# Patient Record
Sex: MALE | Race: WHITE | NOT HISPANIC OR LATINO | Employment: FULL TIME | URBAN - METROPOLITAN AREA
[De-identification: names, ages, dates, MRNs, and addresses within clinical notes are randomized per-mention and may not be internally consistent; named-entity substitution may affect disease eponyms.]

---

## 2021-02-10 ENCOUNTER — TRANSFERRED RECORDS (OUTPATIENT)
Dept: HEALTH INFORMATION MANAGEMENT | Facility: CLINIC | Age: 31
End: 2021-02-10

## 2021-02-10 ENCOUNTER — HOSPITAL ENCOUNTER (OUTPATIENT)
Facility: CLINIC | Age: 31
Setting detail: OBSERVATION
Discharge: HOME | End: 2021-02-11
Attending: NEUROLOGICAL SURGERY | Admitting: NEUROLOGICAL SURGERY
Payer: COMMERCIAL

## 2021-02-10 DIAGNOSIS — Q28.2 ARTERIOVENOUS MALFORMATION OF BRAIN: Primary | ICD-10-CM

## 2021-02-10 PROBLEM — R58 BLEEDING: Status: ACTIVE | Noted: 2021-02-10

## 2021-02-10 LAB
ALBUMIN SERPL-MCNC: 4 G/DL (ref 3.4–5)
ALP SERPL-CCNC: 43 U/L (ref 40–150)
ALT SERPL W P-5'-P-CCNC: 22 U/L (ref 0–70)
ANION GAP SERPL CALCULATED.3IONS-SCNC: 8 MMOL/L (ref 3–14)
APTT PPP: 24 SEC (ref 22–37)
AST SERPL W P-5'-P-CCNC: 20 U/L (ref 0–45)
BILIRUB SERPL-MCNC: 0.6 MG/DL (ref 0.2–1.3)
BUN SERPL-MCNC: 15 MG/DL (ref 7–30)
CALCIUM SERPL-MCNC: 9.1 MG/DL (ref 8.5–10.1)
CHLORIDE SERPL-SCNC: 104 MMOL/L (ref 94–109)
CO2 SERPL-SCNC: 27 MMOL/L (ref 20–32)
CREAT SERPL-MCNC: 1.05 MG/DL (ref 0.72–1.25)
CREAT SERPL-MCNC: 1.07 MG/DL (ref 0.66–1.25)
ERYTHROCYTE [DISTWIDTH] IN BLOOD BY AUTOMATED COUNT: 12.7 % (ref 10–15)
GFR SERPL CREATININE-BSD FRML MDRD: >60 ML/MIN/1.73M2
GFR SERPL CREATININE-BSD FRML MDRD: >90 ML/MIN/{1.73_M2}
GLUCOSE SERPL-MCNC: 83 MG/DL (ref 65–100)
GLUCOSE SERPL-MCNC: 88 MG/DL (ref 70–99)
HCT VFR BLD AUTO: 42 % (ref 40–53)
HGB BLD-MCNC: 14.5 G/DL (ref 13.3–17.7)
INR PPP: 1
INR PPP: 1.05 (ref 0.86–1.14)
MAGNESIUM SERPL-MCNC: 2.4 MG/DL (ref 1.6–2.3)
MCH RBC QN AUTO: 31.1 PG (ref 26.5–33)
MCHC RBC AUTO-ENTMCNC: 34.5 G/DL (ref 31.5–36.5)
MCV RBC AUTO: 90 FL (ref 78–100)
PHOSPHATE SERPL-MCNC: 3.5 MG/DL (ref 2.5–4.5)
PLATELET # BLD AUTO: 234 10E9/L (ref 150–450)
POTASSIUM SERPL-SCNC: 3.9 MMOL/L (ref 3.4–5.3)
POTASSIUM SERPL-SCNC: 4 MMOL/L (ref 3.5–5)
PROT SERPL-MCNC: 7.4 G/DL (ref 6.8–8.8)
RBC # BLD AUTO: 4.66 10E12/L (ref 4.4–5.9)
SODIUM SERPL-SCNC: 138 MMOL/L (ref 133–144)
WBC # BLD AUTO: 13.2 10E9/L (ref 4–11)

## 2021-02-10 PROCEDURE — 86900 BLOOD TYPING SEROLOGIC ABO: CPT | Performed by: STUDENT IN AN ORGANIZED HEALTH CARE EDUCATION/TRAINING PROGRAM

## 2021-02-10 PROCEDURE — 83735 ASSAY OF MAGNESIUM: CPT | Performed by: NEUROLOGICAL SURGERY

## 2021-02-10 PROCEDURE — 84100 ASSAY OF PHOSPHORUS: CPT | Performed by: NEUROLOGICAL SURGERY

## 2021-02-10 PROCEDURE — 93010 ELECTROCARDIOGRAM REPORT: CPT | Performed by: INTERNAL MEDICINE

## 2021-02-10 PROCEDURE — 36415 COLL VENOUS BLD VENIPUNCTURE: CPT | Performed by: STUDENT IN AN ORGANIZED HEALTH CARE EDUCATION/TRAINING PROGRAM

## 2021-02-10 PROCEDURE — 86850 RBC ANTIBODY SCREEN: CPT | Performed by: STUDENT IN AN ORGANIZED HEALTH CARE EDUCATION/TRAINING PROGRAM

## 2021-02-10 PROCEDURE — 80053 COMPREHEN METABOLIC PANEL: CPT | Performed by: NEUROLOGICAL SURGERY

## 2021-02-10 PROCEDURE — 200N000002 HC R&B ICU UMMC

## 2021-02-10 PROCEDURE — 258N000003 HC RX IP 258 OP 636: Performed by: STUDENT IN AN ORGANIZED HEALTH CARE EDUCATION/TRAINING PROGRAM

## 2021-02-10 PROCEDURE — 250N000011 HC RX IP 250 OP 636: Performed by: STUDENT IN AN ORGANIZED HEALTH CARE EDUCATION/TRAINING PROGRAM

## 2021-02-10 PROCEDURE — 86901 BLOOD TYPING SEROLOGIC RH(D): CPT | Performed by: STUDENT IN AN ORGANIZED HEALTH CARE EDUCATION/TRAINING PROGRAM

## 2021-02-10 PROCEDURE — 85610 PROTHROMBIN TIME: CPT | Performed by: STUDENT IN AN ORGANIZED HEALTH CARE EDUCATION/TRAINING PROGRAM

## 2021-02-10 PROCEDURE — 85730 THROMBOPLASTIN TIME PARTIAL: CPT | Performed by: STUDENT IN AN ORGANIZED HEALTH CARE EDUCATION/TRAINING PROGRAM

## 2021-02-10 PROCEDURE — 85027 COMPLETE CBC AUTOMATED: CPT | Performed by: STUDENT IN AN ORGANIZED HEALTH CARE EDUCATION/TRAINING PROGRAM

## 2021-02-10 PROCEDURE — 84100 ASSAY OF PHOSPHORUS: CPT | Performed by: STUDENT IN AN ORGANIZED HEALTH CARE EDUCATION/TRAINING PROGRAM

## 2021-02-10 PROCEDURE — 80053 COMPREHEN METABOLIC PANEL: CPT | Performed by: STUDENT IN AN ORGANIZED HEALTH CARE EDUCATION/TRAINING PROGRAM

## 2021-02-10 RX ORDER — SODIUM CHLORIDE 9 MG/ML
INJECTION, SOLUTION INTRAVENOUS CONTINUOUS
Status: DISCONTINUED | OUTPATIENT
Start: 2021-02-10 | End: 2021-02-11 | Stop reason: HOSPADM

## 2021-02-10 RX ORDER — ONDANSETRON 2 MG/ML
4 INJECTION INTRAMUSCULAR; INTRAVENOUS EVERY 6 HOURS PRN
Status: DISCONTINUED | OUTPATIENT
Start: 2021-02-10 | End: 2021-02-11 | Stop reason: HOSPADM

## 2021-02-10 RX ORDER — ONDANSETRON 4 MG/1
4 TABLET, ORALLY DISINTEGRATING ORAL EVERY 6 HOURS PRN
Status: DISCONTINUED | OUTPATIENT
Start: 2021-02-10 | End: 2021-02-11 | Stop reason: HOSPADM

## 2021-02-10 RX ORDER — DEXTROSE MONOHYDRATE 25 G/50ML
25-50 INJECTION, SOLUTION INTRAVENOUS
Status: DISCONTINUED | OUTPATIENT
Start: 2021-02-10 | End: 2021-02-11 | Stop reason: HOSPADM

## 2021-02-10 RX ORDER — NICOTINE POLACRILEX 4 MG
15-30 LOZENGE BUCCAL
Status: DISCONTINUED | OUTPATIENT
Start: 2021-02-10 | End: 2021-02-11 | Stop reason: HOSPADM

## 2021-02-10 RX ORDER — LABETALOL HYDROCHLORIDE 5 MG/ML
10-20 INJECTION, SOLUTION INTRAVENOUS EVERY 6 HOURS PRN
Status: DISCONTINUED | OUTPATIENT
Start: 2021-02-10 | End: 2021-02-11 | Stop reason: HOSPADM

## 2021-02-10 RX ADMIN — LEVETIRACETAM 2000 MG: 100 INJECTION, SOLUTION INTRAVENOUS at 23:30

## 2021-02-10 RX ADMIN — SODIUM CHLORIDE: 9 INJECTION, SOLUTION INTRAVENOUS at 23:15

## 2021-02-10 ASSESSMENT — ACTIVITIES OF DAILY LIVING (ADL)
WEAR_GLASSES_OR_BLIND: NO
TOILETING_ISSUES: NO
CONCENTRATING,_REMEMBERING_OR_MAKING_DECISIONS_DIFFICULTY: NO
DOING_ERRANDS_INDEPENDENTLY_DIFFICULTY: NO
FALL_HISTORY_WITHIN_LAST_SIX_MONTHS: NO
DRESSING/BATHING_DIFFICULTY: NO
WALKING_OR_CLIMBING_STAIRS_DIFFICULTY: NO
HEARING_DIFFICULTY_OR_DEAF: NO
PATIENT_/_FAMILY_COMMUNICATION_STYLE: SPOKEN LANGUAGE (ENGLISH OR BILINGUAL)
DIFFICULTY_EATING/SWALLOWING: NO
DIFFICULTY_COMMUNICATING: NO

## 2021-02-10 ASSESSMENT — MIFFLIN-ST. JEOR: SCORE: 2064.88

## 2021-02-11 ENCOUNTER — APPOINTMENT (OUTPATIENT)
Dept: CT IMAGING | Facility: CLINIC | Age: 31
End: 2021-02-11
Attending: STUDENT IN AN ORGANIZED HEALTH CARE EDUCATION/TRAINING PROGRAM
Payer: COMMERCIAL

## 2021-02-11 ENCOUNTER — PATIENT OUTREACH (OUTPATIENT)
Dept: CARE COORDINATION | Facility: CLINIC | Age: 31
End: 2021-02-11

## 2021-02-11 ENCOUNTER — TRANSFERRED RECORDS (OUTPATIENT)
Dept: HEALTH INFORMATION MANAGEMENT | Facility: CLINIC | Age: 31
End: 2021-02-11

## 2021-02-11 ENCOUNTER — APPOINTMENT (OUTPATIENT)
Dept: INTERVENTIONAL RADIOLOGY/VASCULAR | Facility: CLINIC | Age: 31
End: 2021-02-11
Attending: NEUROLOGICAL SURGERY
Payer: COMMERCIAL

## 2021-02-11 VITALS
HEIGHT: 73 IN | BODY MASS INDEX: 30.42 KG/M2 | RESPIRATION RATE: 14 BRPM | OXYGEN SATURATION: 100 % | TEMPERATURE: 97.2 F | WEIGHT: 229.5 LBS | SYSTOLIC BLOOD PRESSURE: 136 MMHG | HEART RATE: 64 BPM | DIASTOLIC BLOOD PRESSURE: 78 MMHG

## 2021-02-11 DIAGNOSIS — Q28.2 AVM (ARTERIOVENOUS MALFORMATION) BRAIN: Primary | ICD-10-CM

## 2021-02-11 DIAGNOSIS — Z11.59 ENCOUNTER FOR SCREENING FOR OTHER VIRAL DISEASES: ICD-10-CM

## 2021-02-11 LAB
ANION GAP SERPL CALCULATED.3IONS-SCNC: 4 MMOL/L (ref 3–14)
BUN SERPL-MCNC: 15 MG/DL (ref 7–30)
CALCIUM SERPL-MCNC: 8.5 MG/DL (ref 8.5–10.1)
CHLORIDE SERPL-SCNC: 106 MMOL/L (ref 94–109)
CO2 SERPL-SCNC: 27 MMOL/L (ref 20–32)
CREAT SERPL-MCNC: 1.11 MG/DL (ref 0.66–1.25)
ERYTHROCYTE [DISTWIDTH] IN BLOOD BY AUTOMATED COUNT: 12.8 % (ref 10–15)
GFR SERPL CREATININE-BSD FRML MDRD: 90 ML/MIN/{1.73_M2}
GLUCOSE SERPL-MCNC: 98 MG/DL (ref 70–99)
HCT VFR BLD AUTO: 40.6 % (ref 40–53)
HGB BLD-MCNC: 14.1 G/DL (ref 13.3–17.7)
MAGNESIUM SERPL-MCNC: 2.5 MG/DL (ref 1.6–2.3)
MCH RBC QN AUTO: 31.7 PG (ref 26.5–33)
MCHC RBC AUTO-ENTMCNC: 34.7 G/DL (ref 31.5–36.5)
MCV RBC AUTO: 91 FL (ref 78–100)
PHOSPHATE SERPL-MCNC: 3.9 MG/DL (ref 2.5–4.5)
PLATELET # BLD AUTO: 236 10E9/L (ref 150–450)
POTASSIUM SERPL-SCNC: 3.7 MMOL/L (ref 3.4–5.3)
RBC # BLD AUTO: 4.45 10E12/L (ref 4.4–5.9)
SODIUM SERPL-SCNC: 137 MMOL/L (ref 133–144)
WBC # BLD AUTO: 10.8 10E9/L (ref 4–11)

## 2021-02-11 PROCEDURE — 85027 COMPLETE CBC AUTOMATED: CPT | Performed by: STUDENT IN AN ORGANIZED HEALTH CARE EDUCATION/TRAINING PROGRAM

## 2021-02-11 PROCEDURE — 70450 CT HEAD/BRAIN W/O DYE: CPT

## 2021-02-11 PROCEDURE — 272N000566 HC SHEATH CR3

## 2021-02-11 PROCEDURE — 258N000003 HC RX IP 258 OP 636: Performed by: STUDENT IN AN ORGANIZED HEALTH CARE EDUCATION/TRAINING PROGRAM

## 2021-02-11 PROCEDURE — G0378 HOSPITAL OBSERVATION PER HR: HCPCS

## 2021-02-11 PROCEDURE — C1887 CATHETER, GUIDING: HCPCS

## 2021-02-11 PROCEDURE — 250N000011 HC RX IP 250 OP 636: Performed by: NEUROLOGICAL SURGERY

## 2021-02-11 PROCEDURE — 255N000002 HC RX 255 OP 636: Performed by: NEUROLOGICAL SURGERY

## 2021-02-11 PROCEDURE — 36224 PLACE CATH CAROTD ART: CPT

## 2021-02-11 PROCEDURE — 84100 ASSAY OF PHOSPHORUS: CPT | Performed by: STUDENT IN AN ORGANIZED HEALTH CARE EDUCATION/TRAINING PROGRAM

## 2021-02-11 PROCEDURE — 250N000011 HC RX IP 250 OP 636: Performed by: PSYCHIATRY & NEUROLOGY

## 2021-02-11 PROCEDURE — 272N000506 HC NEEDLE CR6

## 2021-02-11 PROCEDURE — 70450 CT HEAD/BRAIN W/O DYE: CPT | Mod: 26 | Performed by: RADIOLOGY

## 2021-02-11 PROCEDURE — 36415 COLL VENOUS BLD VENIPUNCTURE: CPT | Performed by: STUDENT IN AN ORGANIZED HEALTH CARE EDUCATION/TRAINING PROGRAM

## 2021-02-11 PROCEDURE — 99153 MOD SED SAME PHYS/QHP EA: CPT

## 2021-02-11 PROCEDURE — 99152 MOD SED SAME PHYS/QHP 5/>YRS: CPT

## 2021-02-11 PROCEDURE — 93005 ELECTROCARDIOGRAM TRACING: CPT

## 2021-02-11 PROCEDURE — 99205 OFFICE O/P NEW HI 60 MIN: CPT | Mod: GC | Performed by: PSYCHIATRY & NEUROLOGY

## 2021-02-11 PROCEDURE — C1769 GUIDE WIRE: HCPCS

## 2021-02-11 PROCEDURE — 250N000009 HC RX 250: Performed by: PSYCHIATRY & NEUROLOGY

## 2021-02-11 PROCEDURE — 36228 PLACE CATH INTRACRANIAL ART: CPT

## 2021-02-11 PROCEDURE — 999N000054 HC STATISTIC EKG NON-CHARGEABLE

## 2021-02-11 PROCEDURE — 99152 MOD SED SAME PHYS/QHP 5/>YRS: CPT | Mod: GC | Performed by: NEUROLOGICAL SURGERY

## 2021-02-11 PROCEDURE — 80048 BASIC METABOLIC PNL TOTAL CA: CPT | Performed by: STUDENT IN AN ORGANIZED HEALTH CARE EDUCATION/TRAINING PROGRAM

## 2021-02-11 PROCEDURE — 272N000192 HC ACCESSORY CR2

## 2021-02-11 PROCEDURE — 36224 PLACE CATH CAROTD ART: CPT | Mod: LT | Performed by: NEUROLOGICAL SURGERY

## 2021-02-11 PROCEDURE — 83735 ASSAY OF MAGNESIUM: CPT | Performed by: STUDENT IN AN ORGANIZED HEALTH CARE EDUCATION/TRAINING PROGRAM

## 2021-02-11 PROCEDURE — 250N000011 HC RX IP 250 OP 636: Performed by: STUDENT IN AN ORGANIZED HEALTH CARE EDUCATION/TRAINING PROGRAM

## 2021-02-11 PROCEDURE — 36228 PLACE CATH INTRACRANIAL ART: CPT | Mod: LT | Performed by: NEUROLOGICAL SURGERY

## 2021-02-11 RX ORDER — NALOXONE HYDROCHLORIDE 0.4 MG/ML
0.4 INJECTION, SOLUTION INTRAMUSCULAR; INTRAVENOUS; SUBCUTANEOUS
Status: DISCONTINUED | OUTPATIENT
Start: 2021-02-11 | End: 2021-02-11 | Stop reason: HOSPADM

## 2021-02-11 RX ORDER — HEPARIN SODIUM 200 [USP'U]/100ML
1 INJECTION, SOLUTION INTRAVENOUS CONTINUOUS PRN
Status: DISCONTINUED | OUTPATIENT
Start: 2021-02-11 | End: 2021-02-11 | Stop reason: HOSPADM

## 2021-02-11 RX ORDER — LEVETIRACETAM 750 MG/1
750 TABLET ORAL 2 TIMES DAILY
Qty: 60 TABLET | Refills: 3 | Status: SHIPPED | OUTPATIENT
Start: 2021-02-11 | End: 2021-02-11

## 2021-02-11 RX ORDER — FLUOXETINE 10 MG/1
30 CAPSULE ORAL DAILY
COMMUNITY

## 2021-02-11 RX ORDER — PROCHLORPERAZINE 25 MG
25 SUPPOSITORY, RECTAL RECTAL EVERY 12 HOURS PRN
Status: CANCELLED | OUTPATIENT
Start: 2021-02-11

## 2021-02-11 RX ORDER — ONDANSETRON 4 MG/1
4 TABLET, ORALLY DISINTEGRATING ORAL EVERY 6 HOURS PRN
Status: CANCELLED | OUTPATIENT
Start: 2021-02-11

## 2021-02-11 RX ORDER — DEXTROSE MONOHYDRATE 25 G/50ML
25-50 INJECTION, SOLUTION INTRAVENOUS
Status: CANCELLED | OUTPATIENT
Start: 2021-02-11

## 2021-02-11 RX ORDER — PROTAMINE SULFATE 10 MG/ML
20 INJECTION, SOLUTION INTRAVENOUS ONCE
Status: COMPLETED | OUTPATIENT
Start: 2021-02-11 | End: 2021-02-11

## 2021-02-11 RX ORDER — NALOXONE HYDROCHLORIDE 0.4 MG/ML
0.2 INJECTION, SOLUTION INTRAMUSCULAR; INTRAVENOUS; SUBCUTANEOUS
Status: DISCONTINUED | OUTPATIENT
Start: 2021-02-11 | End: 2021-02-11 | Stop reason: HOSPADM

## 2021-02-11 RX ORDER — HEPARIN SODIUM 1000 [USP'U]/ML
500-8000 INJECTION, SOLUTION INTRAVENOUS; SUBCUTANEOUS
Status: COMPLETED | OUTPATIENT
Start: 2021-02-11 | End: 2021-02-11

## 2021-02-11 RX ORDER — PROCHLORPERAZINE MALEATE 10 MG
10 TABLET ORAL EVERY 6 HOURS PRN
Status: CANCELLED | OUTPATIENT
Start: 2021-02-11

## 2021-02-11 RX ORDER — FLUMAZENIL 0.1 MG/ML
0.2 INJECTION, SOLUTION INTRAVENOUS
Status: DISCONTINUED | OUTPATIENT
Start: 2021-02-11 | End: 2021-02-11 | Stop reason: HOSPADM

## 2021-02-11 RX ORDER — METOCLOPRAMIDE HYDROCHLORIDE 5 MG/ML
10 INJECTION INTRAMUSCULAR; INTRAVENOUS EVERY 6 HOURS PRN
Status: CANCELLED | OUTPATIENT
Start: 2021-02-11

## 2021-02-11 RX ORDER — SODIUM CHLORIDE 9 MG/ML
INJECTION, SOLUTION INTRAVENOUS CONTINUOUS
Status: CANCELLED | OUTPATIENT
Start: 2021-02-11

## 2021-02-11 RX ORDER — LIDOCAINE 40 MG/G
CREAM TOPICAL
Status: DISCONTINUED | OUTPATIENT
Start: 2021-02-11 | End: 2021-02-11 | Stop reason: HOSPADM

## 2021-02-11 RX ORDER — NICOTINE POLACRILEX 4 MG
15-30 LOZENGE BUCCAL
Status: CANCELLED | OUTPATIENT
Start: 2021-02-11

## 2021-02-11 RX ORDER — LANOLIN ALCOHOL/MO/W.PET/CERES
400 CREAM (GRAM) TOPICAL DAILY
Status: ON HOLD | COMMUNITY
End: 2021-02-11

## 2021-02-11 RX ORDER — DEXTROSE MONOHYDRATE 25 G/50ML
25-50 INJECTION, SOLUTION INTRAVENOUS
Status: DISCONTINUED | OUTPATIENT
Start: 2021-02-11 | End: 2021-02-11 | Stop reason: HOSPADM

## 2021-02-11 RX ORDER — SODIUM CHLORIDE 9 MG/ML
INJECTION, SOLUTION INTRAVENOUS CONTINUOUS
Status: DISCONTINUED | OUTPATIENT
Start: 2021-02-11 | End: 2021-02-11 | Stop reason: HOSPADM

## 2021-02-11 RX ORDER — ONDANSETRON 2 MG/ML
4 INJECTION INTRAMUSCULAR; INTRAVENOUS EVERY 6 HOURS PRN
Status: CANCELLED | OUTPATIENT
Start: 2021-02-11

## 2021-02-11 RX ORDER — NICOTINE POLACRILEX 4 MG
15-30 LOZENGE BUCCAL
Status: DISCONTINUED | OUTPATIENT
Start: 2021-02-11 | End: 2021-02-11 | Stop reason: HOSPADM

## 2021-02-11 RX ORDER — LEVETIRACETAM 750 MG/1
750 TABLET ORAL 2 TIMES DAILY
Qty: 60 TABLET | Refills: 3 | Status: SHIPPED | OUTPATIENT
Start: 2021-02-11

## 2021-02-11 RX ORDER — FENTANYL CITRATE 50 UG/ML
25-50 INJECTION, SOLUTION INTRAMUSCULAR; INTRAVENOUS EVERY 5 MIN PRN
Status: DISCONTINUED | OUTPATIENT
Start: 2021-02-11 | End: 2021-02-11 | Stop reason: HOSPADM

## 2021-02-11 RX ORDER — LORAZEPAM 0.5 MG/1
0.5 TABLET ORAL EVERY 6 HOURS PRN
COMMUNITY

## 2021-02-11 RX ORDER — METOCLOPRAMIDE 10 MG/1
10 TABLET ORAL EVERY 6 HOURS PRN
Status: CANCELLED | OUTPATIENT
Start: 2021-02-11

## 2021-02-11 RX ORDER — IODIXANOL 320 MG/ML
150 INJECTION, SOLUTION INTRAVASCULAR ONCE
Status: COMPLETED | OUTPATIENT
Start: 2021-02-11 | End: 2021-02-11

## 2021-02-11 RX ADMIN — FENTANYL CITRATE 25 MCG: 50 INJECTION, SOLUTION INTRAMUSCULAR; INTRAVENOUS at 11:35

## 2021-02-11 RX ADMIN — SODIUM CHLORIDE: 9 INJECTION, SOLUTION INTRAVENOUS at 06:46

## 2021-02-11 RX ADMIN — FENTANYL CITRATE 25 MCG: 50 INJECTION, SOLUTION INTRAMUSCULAR; INTRAVENOUS at 12:12

## 2021-02-11 RX ADMIN — PROTAMINE SULFATE 20 MG: 10 INJECTION, SOLUTION INTRAVENOUS at 12:31

## 2021-02-11 RX ADMIN — FENTANYL CITRATE 50 MCG: 50 INJECTION, SOLUTION INTRAMUSCULAR; INTRAVENOUS at 11:19

## 2021-02-11 RX ADMIN — FENTANYL CITRATE 25 MCG: 50 INJECTION, SOLUTION INTRAMUSCULAR; INTRAVENOUS at 11:25

## 2021-02-11 RX ADMIN — LEVETIRACETAM 750 MG: 100 INJECTION, SOLUTION INTRAVENOUS at 08:03

## 2021-02-11 RX ADMIN — LIDOCAINE HYDROCHLORIDE 20 ML: 10 INJECTION, SOLUTION EPIDURAL; INFILTRATION; INTRACAUDAL; PERINEURAL at 11:31

## 2021-02-11 RX ADMIN — MIDAZOLAM 0.5 MG: 1 INJECTION INTRAMUSCULAR; INTRAVENOUS at 11:35

## 2021-02-11 RX ADMIN — MIDAZOLAM 1 MG: 1 INJECTION INTRAMUSCULAR; INTRAVENOUS at 11:19

## 2021-02-11 RX ADMIN — HEPARIN SODIUM 6000 UNITS: 1000 INJECTION INTRAVENOUS; SUBCUTANEOUS at 11:30

## 2021-02-11 RX ADMIN — MIDAZOLAM 0.5 MG: 1 INJECTION INTRAMUSCULAR; INTRAVENOUS at 11:25

## 2021-02-11 RX ADMIN — IODIXANOL 40 ML: 320 INJECTION, SOLUTION INTRAVASCULAR at 11:30

## 2021-02-11 ASSESSMENT — VISUAL ACUITY
OU: BASELINE

## 2021-02-11 ASSESSMENT — ACTIVITIES OF DAILY LIVING (ADL)
ADLS_ACUITY_SCORE: 15
ADLS_ACUITY_SCORE: 14
ADLS_ACUITY_SCORE: 15

## 2021-02-11 NOTE — PROGRESS NOTES
Admitted/transferred from: [facility]   Reason for admission/transfer: Neurosurgery consult for AVM   2 RN skin assessment: completed by Astrid STONER and Johan NAJERA  Result of skin assessment and interventions/actions: R groin site from CTA. Intact, no hematoma. Tongue reddened d/t biting during seizure.  Height, weight, drug calc weight: Done  Patient belongings (see Flowsheet)  MDRO education added to care planN/A  ?

## 2021-02-11 NOTE — PROVIDER NOTIFICATION
Pt refusing neuro checks-IR MD notified and at bedside-Pt awake alert, calm when left undisturbed.Reg diet ordered per MD approval

## 2021-02-11 NOTE — PROGRESS NOTES
[facility]     Endovascular Surgical Neuroradiology Pre-Procedure Note      HPI:  Emmanuel Ballard is a 30 year old male with hx of AVM s/p radiation in 2015, and 2019.  He presented to [facility] with migraine like headache and he was told by his girlfriend that he had a seizure.  He has not been on medication recently.  Headache resolved.  No nausea vomiting, sensation changes, weakness.  NM transferred him to [place] where they performed an angiogram and subsequently transferred him to [facility].      Medical History:  Past Medical History:   Diagnosis Date     Depression    AVM s/p radiation  sezirues    Surgical History:  No past surgical history on file.    Family History:  No family history on file.    Social History:      Allergies:  No Known Allergies    Is there a contrast allergy?  No    Medications:  I have reviewed this patient's current medications.    ROS:  The 5 point Review of Systems is negative other than noted in the HPI or here.     PHYSICAL EXAMINATION  Vital Signs:  B/P: 123/64,  T: 97.2,  P: 56,  R: 24    Cardio:  RRR  Pulmonary:  no respiratory distress  Abdomen:  soft, non-tender    Neurologic  Mental Status:  fully alert, attentive and oriented  Cranial Nerves:  PERRL, EOMI with normal smooth pursuit, facial movements symmetric  Motor:  strength 5/5 throughout upper and lower extremities  Sensory: intact to touch    Pre-procedure National Institutes of Health Stroke Scale:   Not applicable    LABS  (most recent Cr, BUN, GFR, PLT, INR, PTT within the past 7 days):  Recent Labs   Lab 02/11/21  0427 02/10/21  2322   CR 1.11 1.07   BUN 15 15   GFRESTIMATED 90 >90   GFRESTBLACK >90 >90    234   INR  --  1.05   PTT  --  24        Platelet Function P2Y12 (PRU):  Not applicable      ASSESSMENT:  29yo male with AVM, small ICH and left MCA aneurysm    PLAN:     - diagnostic angiogram     PRE-PROCEDURE SEDATION ASSESSMENT      Pre-Procedure Sedation Assessment done at 1000.    Expected Level:  Moderate Sedation    Indication:  Sedation is required to allow for neurointerventional procedure.    Consent obtained from patient after discussing the risks, benefits and alternatives.     PO Intake:  Appropriately NPO for procedure    ASA Class:  Class 1 - HEALTHY PATIENT    Mallampati:  Grade 2:  Soft palate, base of uvula, tonsillar pillars, and portion of posterior pharyngeal wall visible    History and physical reviewed and no updates needed. I have reviewed the lab findings, diagnostic data, medications, and the plan for sedation. I have determined this patient to be an appropriate candidate for the planned sedation and procedure and have reassessed the patient IMMEDIATELY PRIOR to sedation and procedure.    Patient was discussed with the Attending, Dr. Sousa, who agrees with the plan.    Michelle Padron MD   Pager: [phone]

## 2021-02-11 NOTE — PLAN OF CARE
Major Shift Events: Pt is slightly bradycardic in the 50's. No issues with BP. On RA. Neurologically intact, following commands, moving stand by assist, A&Ox4. Voiding spontaneously, no BM. NPO for possible procedure today.     Plan: Neuro IR consult. Monitor neuro exam.     For vital signs and complete assessments, please see documentation flowsheets.

## 2021-02-11 NOTE — CONSULTS
[facility]  NeuroCritical Care Consult Note    Patient Name:  Emmanuel Ballard  MRN:  [id number]    :  1990  Date of Service:  2021  Primary care provider:  Aspen Delgado      Reason for Consult: Asked by Dr. Ambrocio to see Emmanuel Ballard for NCC co-management.     History of Present Illness:   Emmanuel Ballard is a 30 year old male with h/o known left frontoparietal AVM who presents with seizure and possible microhemorrhage of the AVM at OSH who was transferred to [facility] for further management of AVM.  Neuro critical care is consulted for comanagement.    He has a known left frontoparietal AVM first diagnosed in  after an event of a migrainous headache episode.  This was treated with radiation in  and again after symptom onset in 2019.  He has never had a seizure before, but previously took Keppra for prophylaxis, although is not currently on this.  This morning, he woke up with a severe headache and attempted to sleep it off.  He awoke from sleep with his girlfriend telling him she thinks he had a seizure.  He felt fatigued, lethargic, confused after this episode and agreed to go to the hospital.  He also had tongue biting from this event.  Work-up included CT head which showed possible acute hemorrhage surrounding the AVM.  Formal angio was done for further assessment and characterization.  He was then transferred to [facility] for further management of the AVM.    On arrival, he is appropriate, oriented, purposeful and engaging with his surroundings.  He has no current deficits and no neurologic concerns.      ROS: A 10-point ROS was performed as per HPI.    PMH:  Past Medical History:   Diagnosis Date     Depression      No past surgical history on file.    Allergies:  No Known Allergies    Medications:      Current Facility-Administered Medications:      glucose gel 15-30 g, 15-30 g, Oral, Q15 Min PRN **OR** dextrose 50 %  "injection 25-50 mL, 25-50 mL, Intravenous, Q15 Min PRN **OR** glucagon injection 1 mg, 1 mg, Subcutaneous, Q15 Min PRN, Nicolas Cruz MD     labetalol (NORMODYNE/TRANDATE) injection 10-20 mg, 10-20 mg, Intravenous, Q6H PRN, Nicolas Cruz MD     levETIRAcetam (KEPPRA) 750 mg in sodium chloride 0.9 % 100 mL intermittent infusion, 750 mg, Intravenous, Q12H, Cesar Lima MD     ondansetron (ZOFRAN-ODT) ODT tab 4 mg, 4 mg, Oral, Q6H PRN **OR** ondansetron (ZOFRAN) injection 4 mg, 4 mg, Intravenous, Q6H PRN, Nicolas Cruz MD     sodium chloride 0.9% infusion, , Intravenous, Continuous, Nicolas Cruz MD, Last Rate: 100 mL/hr at 02/10/21 2315, New Bag at 02/10/21 2315    Social History:  Social History     Tobacco Use     Smoking status: Never Smoker     Smokeless tobacco: Never Used   Substance Use Topics     Alcohol use: Not on file       Family History:    No family history on file.    Physical Examination:   BP (!) 144/76   Pulse 62   Temp 99  F (37.2  C) (Oral)   Resp 21   Ht 1.854 m (6' 1\")   Wt 104.1 kg (229 lb 8 oz)   SpO2 94%   BMI 30.28 kg/m      General: Awake and alert, Sitting in bed, NAD, mostly cooperative  HEENT: Normocephalic, atraumatic, no epistaxis, no oral lesions, MMM  Resp: CTAB, no increased work of breathing  Cardio: RRR, S1/S2 appropriate, no m/r/g  Abdomen: +BS, Soft, non-distended, non-tender  Extremities: Warm and well perfused, peripheral pulses present, no edema  Skin: Not jaundiced, no rash   Psych: Reserved affect    Neuro:  Mental status: Awake, alert, attentive; oriented to P/P/T/M  Speech: Fluent, comprehension and repetition intact; No dysarthria  Cranial nerves: Visual fields intact, Eyes conjugate, PERRL, EOMI w/ normal and smooth pursuit, face expression symmetric, facial sensation intact and symmetric, hearing intact to conversation, shoulder shrug strong, palate rise symmetric, tongue/uvula midline.  Motor: Tone normal. 5/5 strength in x4E. No " atrophy or twitches. Pronator drift absent  Reflexes: Toes down-going.  Sensory: Intact to LT, PP x4E; No lateral extinction   Coordination: FNF intact bilaterally, no dysmetria; Normal heel-shin test bilaterally  Gait: Deferred      Imaging:    MRI brain w/wo (2/10/21)  IMPRESSION:  1.  Acute and subacute parenchymal hemorrhage in the left temporal lobe associated with the patient's treated AVM. The area of hemorrhage is better delineated on MRI given differences in modality compared to the prior CT. A focal area of acute hemorrhage appears similar to the CT. The more subacute appearing hemorrhage is not well delineated on the previous CT. This all may be related to differences in modality versus the possibility of mild increase in hemorrhage compared to the earlier CT. Follow-up CT suggested.  2.  Edema appears stable to mildly worsened.  3.  No significant mass effect.  4.  Abnormal FLAIR signal associated with the adjacent vein of Lyndsey presumably related to slow flow. Attention on angiogram.  5.  Brain MRI otherwise normal.      Impression:  Emmanuel Ballard is a 30 year old male with h/o known left frontoparietal AVM who presents with seizure and possible microhemorrhage of the AVM at OSH who was transferred to [facility] for further management of AVM.       Recommendations:   NEURO:  #AVM, Left frontotemporal c/b acute hemorrhage  #Vasogenic edema secondary to above   #Seizure, resolved  - Admit to Neurosurgery in 4A  - Neuro IR consulted for intervention  - Keppra 2g now, then 750mg BID  - Back to neurological baseline. No need for EEG at this point.   - Neurochecks Q1hr   - SBP <140  - Avoid hypotonic IV fluids  - Head of bed elevated  - Telemetry, EKG  - Imaging: Repeat CTH in AM  - Bedside Glucose Monitoring  - PT/OT/SLP as needed  - Euthermia, Euglycemia      CARDIO:  #HTN  - SBP < 140  - PRN Labetalol      PULM:  No acute concerns  - PRN O2 for sats >90%  - Incentive spirometry Q1H while  awake      GI:  No acute concerns  - NPO and MN  - Bowel regimen: None currently indicated  - GI ppx: None      RENAL:  No acute concerns  - IVF: NS @ 100  - High electrolyte replacement protocol  - I/O      ENDO:  No h/o DM  - bedside glucose monitoring  - Hypoglycemia protocol      HEME:  No acute concerns  - Platelets > 100,000  - INR < 1.5  - Hgb > 7      ID:  No clinical or objective signs of infection  - CTM    Psych:   - Resume fluoxetine post-operatively        Checklist:  FEN: NS @ 100ml/hr; High lyte protocol; NPO at MN for possible intervention  LDA: PIV x1; Right groin IR site, now closed with dressing  PPx:   - DVT: Held for possible procedure; SCDs  - GI: not indicated  Code: FULL    Dispo: ICU      Patient discussed with Fellow Dr. Hua, and Attending Dr. Anthony.    Cesar Lima MD  PGY-2 Neurology Resident  Ascom: [id number]  02/11/2021     Note addended in AM by Klever Quiroga MD during rounds.

## 2021-02-11 NOTE — DISCHARGE SUMMARY
[facility] Discharge Summary and Instructions    Emmanuel Ballard MRN# [id number]   Age: 30 year old YOB: 1990     Date of Admission:  2/10/2021  Date of Discharge::  2/11/2021  Admitting Physician:  Dolly Ambrocio MD  Discharge Physician:  Dolly Ambrocio MD          Admission Diagnoses:   Bleeding [R58]          Discharge Diagnosis:   Bleeding [R58]          Procedures:   2/11/21: Selective diagnostic cerebral angiogram           Brief History of Illness:   Emmanuel Ballard is a 30 year old male with PMH left frontopareital AVM first diagnosed in 2015 after a migraine-like headache, now s/p radiation to the AVM in 2015 and 019, who presented to an outside hospital on 2/10/21 with finding of possible hemorrhage of his AVM.  He was then transferred to [facility] for higher level of care.  On the day prior to his presentation to [facility], the patient reported falling asleep and his girlfriend noting possible seizure-like activity, including the patient biting his tongue.    The patient denied any symptoms related to his AVM over the past couple years.  He previously took Keppra but had not taken any seizure medications for a long time.           Hospital Course:   Patient underwent above-mentioned procedure on 2/11/21. The operation was uncomplicated and he/she was admitted to the surgical ICU for routine post operative cares. On post operative day 1, he was ambulating, voiding without a wagner, eating a regular diet, pain was well controlled and therefore he/she was discharged home with plan to return for IR embolization of his AVM.          Discharge Medications:     Current Discharge Medication List      START taking these medications    Details   levETIRAcetam 750 mg Inject 750 mg into the vein every 12 hours  Qty: 60 Dose, Refills: 0    Associated Diagnoses: Arteriovenous malformation of brain         CONTINUE these medications which have NOT CHANGED    Details   FLUoxetine  (PROZAC) 10 MG capsule Take 30 mg by mouth daily      LORazepam (ATIVAN) 0.5 MG tablet Take 0.5 mg by mouth every 6 hours as needed for anxiety      MULTIPLE VITAMIN-FOLIC ACID PO Take 1 tablet by mouth daily                     Discharge Instructions and Follow-Up:   Discharge diet: Regular   Discharge activity: You may advance activity as tolerated. No strenuous exercise or heay lifting greater than 10 lbs for 4 weeks or until seen and cleared in clinic.   Discharge follow-up: Follow-up with Dr. Dolly Ambrocio MD in 2 weeks   Wound care: Ok to shower,however no scrubbing of the wound and no soaking of the wound, meaning no bathtubs or swimming pools. Pat dry only. Leave wound open to air.     Please call if you have:  1. increased pain, redness, drainage, swelling at your incision  2. fevers > 101.5 F degrees  3. with any questions or concerns.  You may reach the Neurosurgery clinic at [phone] during regular work hours. ER at [phone].    and ask for the Neurosurgery Resident on call at [phone], during off hours or weekends.         Discharge Disposition:   Discharged to home          Reji Brown M.D.  Neurosurgery Resident, PGY-2    Please contact neurosurgery resident on call with questions.    Dial * * [phone], enter [phone] when prompted.

## 2021-02-11 NOTE — PROVIDER NOTIFICATION
Pt refusing neuro checks; alert, talking, denies headache; moving all extremities; bedrest with R leg straight until 14:30 scant oozing at groin site-area soft; pulses via doppler, pt denies numbness/tingling.Pt tolerated meal Plan for discharge today with follow-up. Pt eager for discharge home.

## 2021-02-11 NOTE — PHARMACY-ADMISSION MEDICATION HISTORY
Admission medication history interview status for the 2/10/2021 admission is complete. See Epic admission navigator for allergy information, pharmacy, prior to admission medications and immunization status.     Medication history interview sources:  medication history completed by PharmD at outside hospital prior to transfer    Changes made to PTA medication list (reason)  Added: lorazepam, vitamin  Deleted: amoxicillin  Changed: fluoxetine from 10mg daily to 30mg daily    Additional medication history information (including reliability of information, actions taken by pharmacist):  -Per medication history note in Care Everywhere, patient report being off Keppra for over a year      Prior to Admission medications    Medication Sig Last Dose Taking? Auth Provider   FLUoxetine (PROZAC) 10 MG capsule Take 30 mg by mouth daily  Yes Unknown, Entered By History   LORazepam (ATIVAN) 0.5 MG tablet Take 0.5 mg by mouth every 6 hours as needed for anxiety  Yes Unknown, Entered By History   MULTIPLE VITAMIN-FOLIC ACID PO Take 1 tablet by mouth daily  Yes Unknown, Entered By History     Medication history completed by:     Nasrin Florian, PharmD, BCCCP

## 2021-02-11 NOTE — H&P
"[facility]       NEUROSURGERY H&P NOTE      HPI:  30-year-old male with a past medical history of unknown left frontoparietal AVM first diagnosed in 2015 after an event of migraine-like headache of throbbing quality, status post radiation of his AVM in 2015 and 2019, currently not on any antiseizure medications presented to an outside hospital with possible hemorrhage of the AVM and was transferred to the [facility] for higher level of care.  The patient reports that he had a migraine-like event today and felt like he went to sleep.  His girlfriend told him afterwards that he had a seizure.  He has not had any symptoms from his AVM for the last couple of years and this was the first event since his migraine in 2015.  He took Keppra in the past but has not been taking for a long time.    No recent fevers, chills, nausea, vomiting, chest pain, shortness of breath, and denies weakness, numbness/weakness/paresthesias in extremities, changes in sensation, taste, smell, nor trouble speaking or other neurologic symptoms.    PAST MEDICAL HISTORY:   Past Medical History:   Diagnosis Date     Depression        PAST SURGICAL HISTORY: No past surgical history on file.    FAMILY HISTORY: No family history on file.    SOCIAL HISTORY:   Social History     Tobacco Use     Smoking status: Never Smoker     Smokeless tobacco: Never Used   Substance Use Topics     Alcohol use: Not on file       MEDICATIONS:  No current outpatient medications on file.       Allergies:  No Known Allergies    ROS: 10 point ROS of systems including Constitutional, Eyes, Respiratory, Cardiovascular, Gastroenterology, Genitourinary, Integumentary, Muscularskeletal, Psychiatric were all negative except for pertinent positives noted in my HPI.    Physical exam:   Blood pressure 134/80, pulse 67, temperature 99  F (37.2  C), temperature source Oral, resp. rate (!) 41, height 1.854 m (6' 1\"), weight 104.1 kg (229 lb 8 " oz), SpO2 98 %.  General: awake and alert  HEENT: atraumatic  PULM: breathing comfortably on room air  NEUROLOGIC:  -- Awake; Alert; oriented x 3  -- Follows commands briskly  -- +naming  -- Speech fluent, spontaneous. No aphasia or dysarthria.  -- no gaze preference. No apparent hemineglect.  Cranial Nerves:  -- PERRL 3-2mm bilat and brisk, extraocular movements intact  -- face symmetrical, tongue midline with tongue bite  -- sensory V1-V3 intact bilaterally  -- palate elevates symmetrically, uvula midline  -- hearing grossly intact bilat  -- Trapezii 5/5 strength bilat symmetric  -- Cerebellar: Finger nose finger without dysmetria    Motor:  Normal bulk / tone; no tremor, rigidity, or bradykinesia.  No muscle wasting or fasciculations  No Pronator Drift     Delt Bi Tri Hand Flexion/  Extension Iliopsoas Quadriceps Hamstrings Tibialis Anterior Gastroc    C5 C6 C7 C8/T1 L2 L3 L4-S1 L4 S1   R 5 5 5 5 5 5 5 5 5   L 5 5 5 5 5 5 5 5 5   Sensory:  intact to LT x 4 extremities       Reflexes: no clonus       Bi BR Alex Pat Bab     C5-6 C6 UMN L2-4 UMN   R 2+ 2+ Norm 2+ Norm   L 2+ 2+ Norm 2+ Norm      Gait: deferred      IMAGING:  MRI 2/10/21 OSH:  CT 2/10/21 OSH:  Angio 2/10/21 OSH:  Left fronto-parietal AVM with possible nidus aneurysm, signs of hemorrhage      LABS:   Lab Results   Component Value Date    WBC 13.2 02/10/2021     Lab Results   Component Value Date    RBC 4.66 02/10/2021     Lab Results   Component Value Date    HGB 14.5 02/10/2021     Lab Results   Component Value Date    HCT 42.0 02/10/2021     Lab Results   Component Value Date    MCV 90 02/10/2021     Lab Results   Component Value Date    MCH 31.1 02/10/2021     Lab Results   Component Value Date    MCHC 34.5 02/10/2021     Lab Results   Component Value Date    RDW 12.7 02/10/2021     Lab Results   Component Value Date     02/10/2021     Last Comprehensive Metabolic Panel:  Sodium   Date Value Ref Range Status   02/10/2021 138 133 - 144 mmol/L  Final     Potassium   Date Value Ref Range Status   02/10/2021 3.9 3.4 - 5.3 mmol/L Final     Chloride   Date Value Ref Range Status   02/10/2021 104 94 - 109 mmol/L Final     Carbon Dioxide   Date Value Ref Range Status   02/10/2021 27 20 - 32 mmol/L Final     Anion Gap   Date Value Ref Range Status   02/10/2021 8 3 - 14 mmol/L Final     Glucose   Date Value Ref Range Status   02/10/2021 88 70 - 99 mg/dL Final     Urea Nitrogen   Date Value Ref Range Status   02/10/2021 15 7 - 30 mg/dL Final     Creatinine   Date Value Ref Range Status   02/10/2021 1.07 0.66 - 1.25 mg/dL Final     GFR Estimate   Date Value Ref Range Status   02/10/2021 >90 >60 mL/min/[1.73_m2] Final     Comment:     Non  GFR Calc  Starting 12/18/2018, serum creatinine based estimated GFR (eGFR) will be   calculated using the Chronic Kidney Disease Epidemiology Collaboration   (CKD-EPI) equation.       Calcium   Date Value Ref Range Status   02/10/2021 9.1 8.5 - 10.1 mg/dL Final     INR   Date Value Ref Range Status   02/10/2021 1.05 0.86 - 1.14 Final      PTT   Date Value Ref Range Status   02/10/2021 24 22 - 37 sec Final          ASSESSMENT:  29 y/o M with known left fronto-parietal AVM presents neurologically intact after a seizure and hemorrhage of the AVM.    In addition to the assessment of diagnoses detailed above, this 30 year old male  patient admitted from transfer from another acute care hospital has the following conditions contributing to the complexity of their medical care:    Non-traumatic intracerebral hemorrhage and Cerebral edema ,      PLAN:  - pending discussion with NeuroIR (made aware)  - head CT in the AM  - Activity: up with assist  - HOB > 30 degrees  - Q1hr neuro exams   - Keppra for seizure ppx  - Maintain SBP < 140  - Continuous cardiac monitoring while in ICU  - Continuous pulse oximetry  - Supplemental oxygen PRN  - Incentive spirometry Q1H while awake  - NPO at midnight  - normonatremia   - mIVF  -  Electrolyte replacement protocol  - Continue to monitor intake/output  - Glucose < 180  - Continue glucose checks  - Platelets > 100,000  - INR < 1.5  - Hemoglobin > 8  - DVT: SCDs while in bed  - Disposition: 4A  -PT/OT consulted    Nicolas Cruz MD, PhD  Neurosurgery Resident, PGY-2    The patient was discussed with Dr. Tom, neurosurgery chief resident.  I have seen this patient with the resident and formulated a plan and agree with this note.  AMP

## 2021-02-11 NOTE — IR NOTE
Patient Name: Emmanuel Ballard  Medical Record Number: [id number]  Today's Date: 2/11/2021    Procedure: Diagnostic cerebral angiogram  Proceduralist: Shawna Lux MD; Gregory Sousa MD    Patient in room: 1057  Procedure Start: 1114  Procedure end: 1220  Sedation medications administered: 2 mg midazolam, 125 mcg fentanyl     Report given to: Shelly REDDING,   : n/a    Other Notes: Pt arrived to IR room 3 from . Consent reviewed. Pt denies any questions or concerns regarding procedure. Pt positioned supine and monitored per protocol. Pt tolerated procedure without any noted complications. Pt transferred back to .    Access obtained through R groin. Angioseal used for closure- deployed at 1215. 1430 ambulation time as per MD.

## 2021-02-11 NOTE — PROGRESS NOTES
[facility]  Neurosurgery Progress Note    Subjective: no acute events overnight    Objective:   Temp:  [98.4  F (36.9  C)-99  F (37.2  C)] 98.4  F (36.9  C)  Pulse:  [56-69] 56  Resp:  [15-41] 15  BP: (105-144)/(49-80) 123/64  SpO2:  [94 %-99 %] 99 %  I/O last 3 completed shifts:  In: 1225 [P.O.:300; I.V.:925]  Out: 450 [Urine:450]    Gen: Appears comfortable, NADt  Neurologic:  - Alert & Oriented to person, place, time, and situation  - Follows commands briskly  - Speech fluent, spontaneous. No aphasia or dysarthria.  - No gaze preference. No apparent hemineglect.  - PERRL, EOMI  - Face symmetric with sensation intact to light touch  - Tongue protrudes midline  - Trapezii and sternocleidomastoid muscles 5/5 bilaterally  - No pronator drift     Del Tr Bi WE WF Gr   R 5 5 5 5 5 5   L 5 5 5 5 5 5    HF KE KF DF PF EHL   R 5 5 5 5 5 5   L 5 5 5 5 5 5     Reflexes 2+ throughout    Sensation intact and symmetric to light touch throughout    LABS  Recent Labs   Lab Test 02/11/21  0427 02/10/21  2322   WBC 10.8 13.2*   HGB 14.1 14.5   MCV 91 90    234       Recent Labs   Lab Test 02/11/21  0427 02/10/21  2322    138   POTASSIUM 3.7 3.9   CHLORIDE 106 104   CO2 27 27   BUN 15 15   CR 1.11 1.07   ANIONGAP 4 8   CAROL 8.5 9.1   GLC 98 88       IMAGING  Head CT 2/11/21                                                                   Impression: Stable size of left temporal lobe hemorrhage with surrounding vasogenic edema. No new area of hemorrhage. No significant midline shift.      Assessment:  31 y/o M with known left fronto-parietal AVM presents neurologically intact after a seizure and hemorrhage of the AVM.     In addition to the assessment of diagnoses detailed above, this 30 year old male  patient admitted from transfer from another acute care hospital has the following conditions contributing to the complexity of their medical care:     Non-traumatic intracerebral hemorrhage and  Cerebral edema        Plan:  - pending evaluation by NeuroIR  - Activity: up with assist  - HOB > 30 degrees  - Q1hr neuro exams   - Keppra for seizure ppx  - Maintain SBP < 140  - Continuous cardiac monitoring while in ICU  - Continuous pulse oximetry  - Supplemental oxygen PRN  - Incentive spirometry Q1H while awake  - NPO since midnight  - normonatremia   - mIVF  - Electrolyte replacement protocol  - Continue to monitor intake/output  - Glucose < 180  - Continue glucose checks  - Platelets > 100,000  - INR < 1.5  - Hemoglobin > 8  - DVT: SCDs while in bed  - Disposition: 4A  - PT/OT consulted      Nicolas Cruz MD, PhD  Neurosurgery Resident PGY-2    Please contact neurosurgery resident on call with questions.    Dial * * [phone], enter [phone] when prompted.   I have seen this patient with the resident and formulated a plan and agree with this note.  AMP

## 2021-02-11 NOTE — PROCEDURES
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 Gregory Sousa  Fellow:  Kilo    Prior to the start of the procedure and with procedural staff participation, I verbally confirmed: the patient s identity using two indicators, relevant allergies, that the procedure was appropriate and matched the consent or emergent situation, and that the correct equipment/implants were available. Immediately prior to starting the procedure I conducted the Time Out with the procedural staff and re-confirmed the patient s name, procedure, and site/side. (The Joint Commission universal protocol was followed.)  Yes    PRU value: Not applicable    Anesthesia:  Conscious Sedation  Medications:  Fentanyl, Midazolam  Puncture site:  Right Femoral Artery    Fluoroscopy time (minutes):  33.2  Radiation dose (mGy): 1723  Contrast amount (mL): 40    Estimated blood loss (mL):  minimal    Closure:  Device  - angioseal    Disposition:  Will be followed in hospital by the Neurosurgery team.        Sedation Post-Procedure Summary    Sedatives: Fentanyl and Midazolam (Versed) - 125, 2    Vital signs and pulse oximetry were monitored and remained stable throughout the procedure, and sedation was maintained until the procedure was complete.  The patient was monitored by staff until sedation discharge criteria were met.    Patient tolerance:  Patient tolerated the procedure well with no immediate complications.    Time of sedation in minutes: 60 minutes from beginning to end of physician one to one monitoring.    Michelle Padron,  MD  Pager:  [phone]

## 2021-02-11 NOTE — UTILIZATION REVIEW
"  Admission Status; Secondary Review Determination         Under the authority of the Utilization Management Committee, the utilization review process indicated a secondary review on the above patient.  The review outcome is based on review of the medical records, discussions with staff, and applying clinical experience noted on the date of the review.          (x) Observation Status Appropriate - This patient does not meet hospital inpatient criteria and is placed in observation status. If this patient's primary payer is Medicare and was admitted as an inpatient, Condition Code 44 should be used and patient status changed to \"observation\".     RATIONALE FOR DETERMINATION     The severity of illness, intensity of service provided, expected LOS and risk for adverse outcome make the care appropriate for further observation; however, doesn't meet criteria for hospital inpatient admission. Neurosurg resident  notified of this determination.    The patient is a 30-year-old male who was admitted on 02/10/2021.  He was initially seen in the emergency room at [facility] and transferred to [facility] and then transferred to [facility] to evaluate and treat headache and post seizure with a history of an AVM, intra nidal aneurysm.  Patient was initially accepted by Dr. Dolly Ambrocio and transferred to the service of Dr. Gregory Sousa who did a diagnostic cervical cerebral angiography.  The case was discussed with one of the neuro residents.  Following observation post procedure and reestablishment of antiseizure medications, the patient will be discharged with a total of 1 night stay in the hospital.  Patient is appropriate to be changed from inpatient to observation status plus the procedure.          The information on this document is developed by the utilization review team in order for the business office to ensure compliance.  This only denotes the appropriateness of proper admission status and does not " reflect the quality of care rendered.         The definitions of Inpatient Status and Observation Status used in making the determination above are those provided in the CMS Coverage Manual, Chapter 1 and Chapter 6, section 70.4.      Sincerely,     Ronnie Ferreira MD  Physician Advisor  Utilization Review/ Case Management  [facility].

## 2021-02-12 NOTE — PROGRESS NOTES
Attempted to contact the patient x2 for post-hospital call without answer. Will close encounter at this time.    Antonietta Gordillo CMA, [facility]  Post Hospital Discharge Team

## 2021-02-13 LAB — INTERPRETATION ECG - MUSE: NORMAL

## 2021-02-15 ENCOUNTER — TELEPHONE (OUTPATIENT)
Dept: NEUROLOGY | Facility: CLINIC | Age: 31
End: 2021-02-15

## 2021-02-15 DIAGNOSIS — Q28.2 AVM (ARTERIOVENOUS MALFORMATION) BRAIN: Primary | ICD-10-CM

## 2021-02-15 NOTE — PATIENT INSTRUCTIONS
You are scheduled for embolization with Dr. Sousa on 2/26/21 at 12:30 PM.    Please follow these instructions:    * Prior to your procedure you will need to obtain COVID-19 testing within 2-4 days of your scheduled procedure. You are scheduled for 2/22/21 at 10:30 AM at [facility], located at [address] [place] [id number]. Their contact number is [phone]. You will also need to obtain blood work (ordered by Dr. Padron) at time of COVID testing. Please contact [facility] to schedule an appointment for your blood work.    * You do not need a pre-op physical given your recent hospital stay.     * You should arrive at the Surgery Admission Unit (3C), on the third floor, at the [facility] at 10:30 AM. The address is [address] [place] [address]. The phone number is [phone]. A map is enclosed.    * Do not eat after 4:30 AM; you may drink clear liquids (includes water, Jell-O, clear broth, apple juice or any non-carbonated beverage that you can see through) until 10:30 AM.     * You may take your medications with a sip of water the morning of the procedure.     * You will stay overnight at the hospital for observation after the procedure.    PLEASE NOTE our COVID-19 visitors policy: For the protection of our patients and visitors, patients are allowed one consistent visitor, 18 years of age or older, per adult patient in the hospital. Visitors must wear a mask at all times while in the hospital.     If you have questions regarding your procedure, please contact me at [phone], option 3.    If you need to cancel, reschedule or have procedure scheduling related questions, please call Swetha at [phone].    Thank you,  Víctor Alberts RN, CNRN  Stroke & Endovascular Care Coordinator

## 2021-02-15 NOTE — TELEPHONE ENCOUNTER
Scheduled for 2/26/21 10:30/12:30. Patient will need lab and COVID test.     Informed patient of procedure instructions. Confirmed date and time. Patient verbalized understanding. All questions answered. Map and patient instructions mailed to patient. Patient will call to schedule blood work. Patient has my contact information and was encouraged to call with questions/concerns. Lin Alberts RN 2/15/2021 3:20 PM

## 2021-02-15 NOTE — TELEPHONE ENCOUNTER
----- Message from Michelle Padron MD sent at 2/11/2021  2:40 PM CST -----  Regarding: set up  Can you get this michael set up in 2 weeks with Dr. Sousa for endovascular AVM treatment under general anesthesia.  Left side.     Please.  And orders are in .     Thanks - Michelle

## 2021-02-19 ENCOUNTER — TELEPHONE (OUTPATIENT)
Dept: NEUROSURGERY | Facility: CLINIC | Age: 31
End: 2021-02-19

## 2021-02-19 NOTE — TELEPHONE ENCOUNTER
[facility] Call Center    Phone Message    May a detailed message be left on voicemail: yes     Reason for Call: Other: pt reporting that he just received the paperwork and it is indicating that he has to stay overnight at the hospital ?  pt wants to confirm that information. Please call pt. Thank you     Action Taken: Message routed to:  [facility] ([facility]    Travel Screening: Not Applicable

## 2021-02-22 DIAGNOSIS — Q28.2 AVM (ARTERIOVENOUS MALFORMATION) BRAIN: ICD-10-CM

## 2021-02-22 DIAGNOSIS — Z11.59 ENCOUNTER FOR SCREENING FOR OTHER VIRAL DISEASES: ICD-10-CM

## 2021-02-22 LAB
ANION GAP SERPL CALCULATED.3IONS-SCNC: <1 MMOL/L (ref 3–14)
BUN SERPL-MCNC: 13 MG/DL (ref 7–30)
CALCIUM SERPL-MCNC: 9.2 MG/DL (ref 8.5–10.1)
CHLORIDE SERPL-SCNC: 108 MMOL/L (ref 94–109)
CO2 SERPL-SCNC: 31 MMOL/L (ref 20–32)
CREAT SERPL-MCNC: 1.12 MG/DL (ref 0.66–1.25)
ERYTHROCYTE [DISTWIDTH] IN BLOOD BY AUTOMATED COUNT: 12.3 % (ref 10–15)
GFR SERPL CREATININE-BSD FRML MDRD: 89 ML/MIN/{1.73_M2}
GLUCOSE SERPL-MCNC: 96 MG/DL (ref 70–99)
HCT VFR BLD AUTO: 41.1 % (ref 40–53)
HGB BLD-MCNC: 14.4 G/DL (ref 13.3–17.7)
INR PPP: 0.9 (ref 0.86–1.14)
MCH RBC QN AUTO: 31.1 PG (ref 26.5–33)
MCHC RBC AUTO-ENTMCNC: 35 G/DL (ref 31.5–36.5)
MCV RBC AUTO: 89 FL (ref 78–100)
PLATELET # BLD AUTO: 287 10E9/L (ref 150–450)
POTASSIUM SERPL-SCNC: 4.4 MMOL/L (ref 3.4–5.3)
RBC # BLD AUTO: 4.63 10E12/L (ref 4.4–5.9)
SARS-COV-2 RNA RESP QL NAA+PROBE: NORMAL
SODIUM SERPL-SCNC: 139 MMOL/L (ref 133–144)
SPECIMEN SOURCE: NORMAL
WBC # BLD AUTO: 7.6 10E9/L (ref 4–11)

## 2021-02-22 PROCEDURE — 80048 BASIC METABOLIC PNL TOTAL CA: CPT | Performed by: NEUROLOGICAL SURGERY

## 2021-02-22 PROCEDURE — 36415 COLL VENOUS BLD VENIPUNCTURE: CPT | Performed by: NEUROLOGICAL SURGERY

## 2021-02-22 PROCEDURE — 87635 SARS-COV-2 COVID-19 AMP PRB: CPT | Performed by: NEUROLOGICAL SURGERY

## 2021-02-22 PROCEDURE — 85027 COMPLETE CBC AUTOMATED: CPT | Performed by: NEUROLOGICAL SURGERY

## 2021-02-22 PROCEDURE — 85610 PROTHROMBIN TIME: CPT | Performed by: NEUROLOGICAL SURGERY

## 2021-02-23 LAB
LABORATORY COMMENT REPORT: NORMAL
SARS-COV-2 RNA RESP QL NAA+PROBE: NEGATIVE
SPECIMEN SOURCE: NORMAL

## 2021-02-26 ENCOUNTER — APPOINTMENT (OUTPATIENT)
Dept: INTERVENTIONAL RADIOLOGY/VASCULAR | Facility: CLINIC | Age: 31
DRG: 025 | End: 2021-02-26
Attending: NEUROLOGICAL SURGERY
Payer: COMMERCIAL

## 2021-02-26 ENCOUNTER — APPOINTMENT (OUTPATIENT)
Dept: CT IMAGING | Facility: CLINIC | Age: 31
DRG: 025 | End: 2021-02-26
Attending: NEUROLOGICAL SURGERY
Payer: COMMERCIAL

## 2021-02-26 ENCOUNTER — HOSPITAL ENCOUNTER (INPATIENT)
Facility: CLINIC | Age: 31
LOS: 1 days | Discharge: HOME | DRG: 025 | End: 2021-02-27
Attending: NEUROLOGICAL SURGERY | Admitting: NEUROLOGICAL SURGERY
Payer: COMMERCIAL

## 2021-02-26 ENCOUNTER — ANESTHESIA (OUTPATIENT)
Dept: SURGERY | Facility: CLINIC | Age: 31
DRG: 025 | End: 2021-02-26
Payer: COMMERCIAL

## 2021-02-26 ENCOUNTER — ANESTHESIA EVENT (OUTPATIENT)
Dept: SURGERY | Facility: CLINIC | Age: 31
DRG: 025 | End: 2021-02-26
Payer: COMMERCIAL

## 2021-02-26 DIAGNOSIS — Q28.2 AVM (ARTERIOVENOUS MALFORMATION) BRAIN: ICD-10-CM

## 2021-02-26 LAB
ABO + RH BLD: NORMAL
ABO + RH BLD: NORMAL
BLD GP AB SCN SERPL QL: NORMAL
BLOOD BANK CMNT PATIENT-IMP: NORMAL
GLUCOSE BLDC GLUCOMTR-MCNC: 128 MG/DL (ref 70–99)
GLUCOSE BLDC GLUCOMTR-MCNC: 90 MG/DL (ref 70–99)
MRSA DNA SPEC QL NAA+PROBE: NEGATIVE
SPECIMEN EXP DATE BLD: NORMAL
SPECIMEN SOURCE: NORMAL

## 2021-02-26 PROCEDURE — 87640 STAPH A DNA AMP PROBE: CPT | Performed by: STUDENT IN AN ORGANIZED HEALTH CARE EDUCATION/TRAINING PROGRAM

## 2021-02-26 PROCEDURE — 250N000011 HC RX IP 250 OP 636: Performed by: ANESTHESIOLOGY

## 2021-02-26 PROCEDURE — 999N000157 HC STATISTIC RCP TIME EA 10 MIN

## 2021-02-26 PROCEDURE — 999N001017 HC STATISTIC GLUCOSE BY METER IP

## 2021-02-26 PROCEDURE — C1887 CATHETER, GUIDING: HCPCS

## 2021-02-26 PROCEDURE — 258N000003 HC RX IP 258 OP 636: Performed by: ANESTHESIOLOGY

## 2021-02-26 PROCEDURE — 999N000015 HC STATISTIC ARTERIAL MONITORING DAILY

## 2021-02-26 PROCEDURE — 76937 US GUIDE VASCULAR ACCESS: CPT

## 2021-02-26 PROCEDURE — C1769 GUIDE WIRE: HCPCS

## 2021-02-26 PROCEDURE — 250N000009 HC RX 250: Performed by: NURSE ANESTHETIST, CERTIFIED REGISTERED

## 2021-02-26 PROCEDURE — 75898 FOLLOW-UP ANGIOGRAPHY: CPT | Mod: 26 | Performed by: NEUROLOGICAL SURGERY

## 2021-02-26 PROCEDURE — 61624 TCAT PERM OCCLS/EMBOLJ CNS: CPT | Mod: GC | Performed by: NEUROLOGICAL SURGERY

## 2021-02-26 PROCEDURE — 70450 CT HEAD/BRAIN W/O DYE: CPT

## 2021-02-26 PROCEDURE — 36224 PLACE CATH CAROTD ART: CPT

## 2021-02-26 PROCEDURE — 370N000017 HC ANESTHESIA TECHNICAL FEE, PER MIN

## 2021-02-26 PROCEDURE — 999N000141 HC STATISTIC PRE-PROCEDURE NURSING ASSESSMENT

## 2021-02-26 PROCEDURE — 250N000025 HC SEVOFLURANE, PER MIN

## 2021-02-26 PROCEDURE — 200N000002 HC R&B ICU UMMC

## 2021-02-26 PROCEDURE — 75894 X-RAYS TRANSCATH THERAPY: CPT

## 2021-02-26 PROCEDURE — 250N000009 HC RX 250: Performed by: STUDENT IN AN ORGANIZED HEALTH CARE EDUCATION/TRAINING PROGRAM

## 2021-02-26 PROCEDURE — 75898 FOLLOW-UP ANGIOGRAPHY: CPT

## 2021-02-26 PROCEDURE — 710N000010 HC RECOVERY PHASE 1, LEVEL 2, PER MIN

## 2021-02-26 PROCEDURE — 250N000011 HC RX IP 250 OP 636: Performed by: NURSE ANESTHETIST, CERTIFIED REGISTERED

## 2021-02-26 PROCEDURE — 272N000192 HC ACCESSORY CR2

## 2021-02-26 PROCEDURE — 87641 MR-STAPH DNA AMP PROBE: CPT | Performed by: STUDENT IN AN ORGANIZED HEALTH CARE EDUCATION/TRAINING PROGRAM

## 2021-02-26 PROCEDURE — 255N000002 HC RX 255 OP 636: Performed by: NEUROLOGICAL SURGERY

## 2021-02-26 PROCEDURE — 272N000572 HC SHEATH CR9

## 2021-02-26 PROCEDURE — 61624 TCAT PERM OCCLS/EMBOLJ CNS: CPT

## 2021-02-26 PROCEDURE — 36228 PLACE CATH INTRACRANIAL ART: CPT | Mod: LT | Performed by: NEUROLOGICAL SURGERY

## 2021-02-26 PROCEDURE — 36224 PLACE CATH CAROTD ART: CPT | Mod: LT | Performed by: NEUROLOGICAL SURGERY

## 2021-02-26 PROCEDURE — 250N000013 HC RX MED GY IP 250 OP 250 PS 637: Performed by: STUDENT IN AN ORGANIZED HEALTH CARE EDUCATION/TRAINING PROGRAM

## 2021-02-26 PROCEDURE — 272N000506 HC NEEDLE CR6

## 2021-02-26 PROCEDURE — 272N000566 HC SHEATH CR3

## 2021-02-26 PROCEDURE — 75894 X-RAYS TRANSCATH THERAPY: CPT | Mod: 26 | Performed by: NEUROLOGICAL SURGERY

## 2021-02-26 PROCEDURE — C1760 CLOSURE DEV, VASC: HCPCS

## 2021-02-26 PROCEDURE — 250N000011 HC RX IP 250 OP 636: Performed by: STUDENT IN AN ORGANIZED HEALTH CARE EDUCATION/TRAINING PROGRAM

## 2021-02-26 PROCEDURE — 272N000409 HC GLUE EMBOLI CR28

## 2021-02-26 PROCEDURE — 03LG3DZ OCCLUSION OF INTRACRANIAL ARTERY WITH INTRALUMINAL DEVICE, PERCUTANEOUS APPROACH: ICD-10-PCS | Performed by: NEUROLOGICAL SURGERY

## 2021-02-26 PROCEDURE — 70450 CT HEAD/BRAIN W/O DYE: CPT | Mod: 26 | Performed by: RADIOLOGY

## 2021-02-26 PROCEDURE — 258N000003 HC RX IP 258 OP 636: Performed by: NURSE ANESTHETIST, CERTIFIED REGISTERED

## 2021-02-26 RX ORDER — SODIUM CHLORIDE 9 MG/ML
INJECTION, SOLUTION INTRAVENOUS CONTINUOUS
Status: DISCONTINUED | OUTPATIENT
Start: 2021-02-26 | End: 2021-02-26

## 2021-02-26 RX ORDER — VERAPAMIL HYDROCHLORIDE 2.5 MG/ML
2.5-2 INJECTION, SOLUTION INTRAVENOUS
Status: COMPLETED | OUTPATIENT
Start: 2021-02-26 | End: 2021-02-26

## 2021-02-26 RX ORDER — LEVETIRACETAM 10 MG/ML
1000 INJECTION INTRAVASCULAR ONCE
Status: COMPLETED | OUTPATIENT
Start: 2021-02-26 | End: 2021-02-26

## 2021-02-26 RX ORDER — METOCLOPRAMIDE 10 MG/1
10 TABLET ORAL EVERY 6 HOURS PRN
Status: DISCONTINUED | OUTPATIENT
Start: 2021-02-26 | End: 2021-02-26

## 2021-02-26 RX ORDER — LIDOCAINE HYDROCHLORIDE 10 MG/ML
1-30 INJECTION, SOLUTION EPIDURAL; INFILTRATION; INTRACAUDAL; PERINEURAL
Status: COMPLETED | OUTPATIENT
Start: 2021-02-26 | End: 2021-02-26

## 2021-02-26 RX ORDER — PROCHLORPERAZINE MALEATE 5 MG
10 TABLET ORAL EVERY 6 HOURS PRN
Status: DISCONTINUED | OUTPATIENT
Start: 2021-02-26 | End: 2021-02-26

## 2021-02-26 RX ORDER — FENTANYL CITRATE 50 UG/ML
25-50 INJECTION, SOLUTION INTRAMUSCULAR; INTRAVENOUS
Status: DISCONTINUED | OUTPATIENT
Start: 2021-02-26 | End: 2021-02-27 | Stop reason: HOSPADM

## 2021-02-26 RX ORDER — ONDANSETRON 2 MG/ML
4 INJECTION INTRAMUSCULAR; INTRAVENOUS EVERY 6 HOURS PRN
Status: DISCONTINUED | OUTPATIENT
Start: 2021-02-26 | End: 2021-02-27 | Stop reason: HOSPADM

## 2021-02-26 RX ORDER — ONDANSETRON 2 MG/ML
4 INJECTION INTRAMUSCULAR; INTRAVENOUS EVERY 30 MIN PRN
Status: DISCONTINUED | OUTPATIENT
Start: 2021-02-26 | End: 2021-02-26

## 2021-02-26 RX ORDER — NALOXONE HYDROCHLORIDE 0.4 MG/ML
0.4 INJECTION, SOLUTION INTRAMUSCULAR; INTRAVENOUS; SUBCUTANEOUS
Status: DISCONTINUED | OUTPATIENT
Start: 2021-02-26 | End: 2021-02-27 | Stop reason: HOSPADM

## 2021-02-26 RX ORDER — ONDANSETRON 2 MG/ML
INJECTION INTRAMUSCULAR; INTRAVENOUS PRN
Status: DISCONTINUED | OUTPATIENT
Start: 2021-02-26 | End: 2021-02-26

## 2021-02-26 RX ORDER — NICOTINE POLACRILEX 4 MG
15-30 LOZENGE BUCCAL
Status: DISCONTINUED | OUTPATIENT
Start: 2021-02-26 | End: 2021-02-27 | Stop reason: HOSPADM

## 2021-02-26 RX ORDER — PROCHLORPERAZINE 25 MG
25 SUPPOSITORY, RECTAL RECTAL EVERY 12 HOURS PRN
Status: DISCONTINUED | OUTPATIENT
Start: 2021-02-26 | End: 2021-02-26

## 2021-02-26 RX ORDER — ESMOLOL HYDROCHLORIDE 10 MG/ML
INJECTION INTRAVENOUS PRN
Status: DISCONTINUED | OUTPATIENT
Start: 2021-02-26 | End: 2021-02-26

## 2021-02-26 RX ORDER — HYDROMORPHONE HCL IN WATER/PF 6 MG/30 ML
0.2 PATIENT CONTROLLED ANALGESIA SYRINGE INTRAVENOUS
Status: DISCONTINUED | OUTPATIENT
Start: 2021-02-26 | End: 2021-02-27 | Stop reason: HOSPADM

## 2021-02-26 RX ORDER — DEXTROSE MONOHYDRATE 25 G/50ML
25-50 INJECTION, SOLUTION INTRAVENOUS
Status: DISCONTINUED | OUTPATIENT
Start: 2021-02-26 | End: 2021-02-27 | Stop reason: HOSPADM

## 2021-02-26 RX ORDER — LIDOCAINE HYDROCHLORIDE 20 MG/ML
INJECTION, SOLUTION INFILTRATION; PERINEURAL PRN
Status: DISCONTINUED | OUTPATIENT
Start: 2021-02-26 | End: 2021-02-26

## 2021-02-26 RX ORDER — ONDANSETRON 4 MG/1
4 TABLET, ORALLY DISINTEGRATING ORAL EVERY 6 HOURS PRN
Status: DISCONTINUED | OUTPATIENT
Start: 2021-02-26 | End: 2021-02-26

## 2021-02-26 RX ORDER — ONDANSETRON 2 MG/ML
4 INJECTION INTRAMUSCULAR; INTRAVENOUS EVERY 6 HOURS PRN
Status: DISCONTINUED | OUTPATIENT
Start: 2021-02-26 | End: 2021-02-26

## 2021-02-26 RX ORDER — FENTANYL CITRATE 50 UG/ML
INJECTION, SOLUTION INTRAMUSCULAR; INTRAVENOUS PRN
Status: DISCONTINUED | OUTPATIENT
Start: 2021-02-26 | End: 2021-02-26

## 2021-02-26 RX ORDER — HEPARIN SODIUM 1000 [USP'U]/ML
INJECTION, SOLUTION INTRAVENOUS; SUBCUTANEOUS PRN
Status: DISCONTINUED | OUTPATIENT
Start: 2021-02-26 | End: 2021-02-26

## 2021-02-26 RX ORDER — BISACODYL 5 MG
10 TABLET, DELAYED RELEASE (ENTERIC COATED) ORAL DAILY PRN
Status: DISCONTINUED | OUTPATIENT
Start: 2021-02-26 | End: 2021-02-27 | Stop reason: HOSPADM

## 2021-02-26 RX ORDER — PROPOFOL 10 MG/ML
INJECTION, EMULSION INTRAVENOUS PRN
Status: DISCONTINUED | OUTPATIENT
Start: 2021-02-26 | End: 2021-02-26

## 2021-02-26 RX ORDER — METOCLOPRAMIDE HYDROCHLORIDE 5 MG/ML
10 INJECTION INTRAMUSCULAR; INTRAVENOUS EVERY 6 HOURS PRN
Status: DISCONTINUED | OUTPATIENT
Start: 2021-02-26 | End: 2021-02-26

## 2021-02-26 RX ORDER — ASPIRIN 325 MG
650 TABLET ORAL ONCE
Status: COMPLETED | OUTPATIENT
Start: 2021-02-26 | End: 2021-02-26

## 2021-02-26 RX ORDER — METOCLOPRAMIDE 10 MG/1
10 TABLET ORAL EVERY 6 HOURS PRN
Status: DISCONTINUED | OUTPATIENT
Start: 2021-02-26 | End: 2021-02-27 | Stop reason: HOSPADM

## 2021-02-26 RX ORDER — NALOXONE HYDROCHLORIDE 0.4 MG/ML
0.2 INJECTION, SOLUTION INTRAMUSCULAR; INTRAVENOUS; SUBCUTANEOUS
Status: DISCONTINUED | OUTPATIENT
Start: 2021-02-26 | End: 2021-02-27 | Stop reason: HOSPADM

## 2021-02-26 RX ORDER — METOCLOPRAMIDE HYDROCHLORIDE 5 MG/ML
10 INJECTION INTRAMUSCULAR; INTRAVENOUS EVERY 6 HOURS PRN
Status: DISCONTINUED | OUTPATIENT
Start: 2021-02-26 | End: 2021-02-27 | Stop reason: HOSPADM

## 2021-02-26 RX ORDER — ONDANSETRON 4 MG/1
4 TABLET, ORALLY DISINTEGRATING ORAL EVERY 6 HOURS PRN
Status: DISCONTINUED | OUTPATIENT
Start: 2021-02-26 | End: 2021-02-27 | Stop reason: HOSPADM

## 2021-02-26 RX ORDER — ONDANSETRON 4 MG/1
4 TABLET, ORALLY DISINTEGRATING ORAL EVERY 30 MIN PRN
Status: DISCONTINUED | OUTPATIENT
Start: 2021-02-26 | End: 2021-02-26

## 2021-02-26 RX ORDER — GLYCOPYRROLATE 0.2 MG/ML
INJECTION, SOLUTION INTRAMUSCULAR; INTRAVENOUS PRN
Status: DISCONTINUED | OUTPATIENT
Start: 2021-02-26 | End: 2021-02-26

## 2021-02-26 RX ORDER — ASPIRIN 325 MG
325 TABLET ORAL DAILY
Status: DISCONTINUED | OUTPATIENT
Start: 2021-02-27 | End: 2021-02-27 | Stop reason: HOSPADM

## 2021-02-26 RX ORDER — DEXAMETHASONE SODIUM PHOSPHATE 4 MG/ML
INJECTION, SOLUTION INTRA-ARTICULAR; INTRALESIONAL; INTRAMUSCULAR; INTRAVENOUS; SOFT TISSUE PRN
Status: DISCONTINUED | OUTPATIENT
Start: 2021-02-26 | End: 2021-02-26

## 2021-02-26 RX ORDER — HEPARIN SODIUM 200 [USP'U]/100ML
1 INJECTION, SOLUTION INTRAVENOUS CONTINUOUS PRN
Status: DISCONTINUED | OUTPATIENT
Start: 2021-02-26 | End: 2021-02-26

## 2021-02-26 RX ORDER — PROCHLORPERAZINE MALEATE 10 MG
10 TABLET ORAL EVERY 6 HOURS PRN
Status: DISCONTINUED | OUTPATIENT
Start: 2021-02-26 | End: 2021-02-27 | Stop reason: HOSPADM

## 2021-02-26 RX ORDER — LIDOCAINE 40 MG/G
CREAM TOPICAL
Status: DISCONTINUED | OUTPATIENT
Start: 2021-02-26 | End: 2021-02-26

## 2021-02-26 RX ORDER — HYDROMORPHONE HYDROCHLORIDE 1 MG/ML
.3-.5 INJECTION, SOLUTION INTRAMUSCULAR; INTRAVENOUS; SUBCUTANEOUS EVERY 5 MIN PRN
Status: DISCONTINUED | OUTPATIENT
Start: 2021-02-26 | End: 2021-02-27 | Stop reason: HOSPADM

## 2021-02-26 RX ORDER — PROCHLORPERAZINE 25 MG
25 SUPPOSITORY, RECTAL RECTAL EVERY 12 HOURS PRN
Status: DISCONTINUED | OUTPATIENT
Start: 2021-02-26 | End: 2021-02-27 | Stop reason: HOSPADM

## 2021-02-26 RX ORDER — PROTAMINE SULFATE 10 MG/ML
5-50 INJECTION, SOLUTION INTRAVENOUS
Status: DISCONTINUED | OUTPATIENT
Start: 2021-02-26 | End: 2021-02-26

## 2021-02-26 RX ORDER — SODIUM CHLORIDE, SODIUM LACTATE, POTASSIUM CHLORIDE, CALCIUM CHLORIDE 600; 310; 30; 20 MG/100ML; MG/100ML; MG/100ML; MG/100ML
INJECTION, SOLUTION INTRAVENOUS CONTINUOUS
Status: DISCONTINUED | OUTPATIENT
Start: 2021-02-26 | End: 2021-02-26

## 2021-02-26 RX ORDER — IODIXANOL 320 MG/ML
150 INJECTION, SOLUTION INTRAVASCULAR ONCE
Status: COMPLETED | OUTPATIENT
Start: 2021-02-26 | End: 2021-02-26

## 2021-02-26 RX ORDER — BISACODYL 5 MG
15 TABLET, DELAYED RELEASE (ENTERIC COATED) ORAL DAILY PRN
Status: DISCONTINUED | OUTPATIENT
Start: 2021-02-26 | End: 2021-02-27 | Stop reason: HOSPADM

## 2021-02-26 RX ORDER — BISACODYL 5 MG
5 TABLET, DELAYED RELEASE (ENTERIC COATED) ORAL DAILY PRN
Status: DISCONTINUED | OUTPATIENT
Start: 2021-02-26 | End: 2021-02-27 | Stop reason: HOSPADM

## 2021-02-26 RX ORDER — LORAZEPAM 0.5 MG/1
0.5 TABLET ORAL EVERY 6 HOURS PRN
Status: DISCONTINUED | OUTPATIENT
Start: 2021-02-26 | End: 2021-02-27 | Stop reason: HOSPADM

## 2021-02-26 RX ORDER — ASPIRIN 325 MG
325 TABLET ORAL DAILY
Status: DISCONTINUED | OUTPATIENT
Start: 2021-02-27 | End: 2021-02-26

## 2021-02-26 RX ORDER — ASPIRIN 325 MG
650 TABLET ORAL ONCE
Status: DISCONTINUED | OUTPATIENT
Start: 2021-02-26 | End: 2021-02-26

## 2021-02-26 RX ORDER — LEVETIRACETAM 750 MG/1
750 TABLET ORAL 2 TIMES DAILY
Status: DISCONTINUED | OUTPATIENT
Start: 2021-02-26 | End: 2021-02-27 | Stop reason: HOSPADM

## 2021-02-26 RX ORDER — ACETAMINOPHEN 325 MG/1
650 TABLET ORAL EVERY 4 HOURS PRN
Status: DISCONTINUED | OUTPATIENT
Start: 2021-02-26 | End: 2021-02-27 | Stop reason: HOSPADM

## 2021-02-26 RX ORDER — NICARDIPINE HYDROCHLORIDE 0.2 MG/ML
2.5-15 INJECTION INTRAVENOUS CONTINUOUS
Status: DISCONTINUED | OUTPATIENT
Start: 2021-02-26 | End: 2021-02-27 | Stop reason: HOSPADM

## 2021-02-26 RX ORDER — PHENYLEPHRINE HCL IN 0.9% NACL 50MG/250ML
.1-1 PLASTIC BAG, INJECTION (ML) INTRAVENOUS CONTINUOUS
Status: DISCONTINUED | OUTPATIENT
Start: 2021-02-26 | End: 2021-02-27 | Stop reason: HOSPADM

## 2021-02-26 RX ADMIN — LEVETIRACETAM 1000 MG: 10 INJECTION INTRAVENOUS at 20:50

## 2021-02-26 RX ADMIN — PROPOFOL 200 MG: 10 INJECTION, EMULSION INTRAVENOUS at 14:07

## 2021-02-26 RX ADMIN — PHENYLEPHRINE HYDROCHLORIDE 100 MCG: 10 INJECTION INTRAVENOUS at 14:24

## 2021-02-26 RX ADMIN — DEXAMETHASONE SODIUM PHOSPHATE 8 MG: 4 INJECTION, SOLUTION INTRA-ARTICULAR; INTRALESIONAL; INTRAMUSCULAR; INTRAVENOUS; SOFT TISSUE at 14:40

## 2021-02-26 RX ADMIN — LEVETIRACETAM 750 MG: 750 TABLET, FILM COATED ORAL at 21:43

## 2021-02-26 RX ADMIN — PHENYLEPHRINE HYDROCHLORIDE 100 MCG: 10 INJECTION INTRAVENOUS at 14:53

## 2021-02-26 RX ADMIN — ACETAMINOPHEN 650 MG: 325 TABLET, FILM COATED ORAL at 23:09

## 2021-02-26 RX ADMIN — FENTANYL CITRATE 50 MCG: 50 INJECTION, SOLUTION INTRAMUSCULAR; INTRAVENOUS at 17:50

## 2021-02-26 RX ADMIN — HYDROMORPHONE HYDROCHLORIDE 0.3 MG: 1 INJECTION, SOLUTION INTRAMUSCULAR; INTRAVENOUS; SUBCUTANEOUS at 17:53

## 2021-02-26 RX ADMIN — VERAPAMIL HYDROCHLORIDE 10 MG: 2.5 INJECTION, SOLUTION INTRAVENOUS at 15:55

## 2021-02-26 RX ADMIN — ESMOLOL HYDROCHLORIDE 30 MG: 10 INJECTION, SOLUTION INTRAVENOUS at 15:54

## 2021-02-26 RX ADMIN — ASPIRIN 325 MG ORAL TABLET 650 MG: 325 PILL ORAL at 18:01

## 2021-02-26 RX ADMIN — ROCURONIUM BROMIDE 100 MG: 10 INJECTION INTRAVENOUS at 14:07

## 2021-02-26 RX ADMIN — FENTANYL CITRATE 100 MCG: 50 INJECTION, SOLUTION INTRAMUSCULAR; INTRAVENOUS at 15:52

## 2021-02-26 RX ADMIN — IODIXANOL 75 ML: 320 INJECTION, SOLUTION INTRAVASCULAR at 16:32

## 2021-02-26 RX ADMIN — PROPOFOL 100 MG: 10 INJECTION, EMULSION INTRAVENOUS at 15:52

## 2021-02-26 RX ADMIN — MIDAZOLAM 2 MG: 1 INJECTION INTRAMUSCULAR; INTRAVENOUS at 12:09

## 2021-02-26 RX ADMIN — HYDROMORPHONE HYDROCHLORIDE 0.3 MG: 1 INJECTION, SOLUTION INTRAMUSCULAR; INTRAVENOUS; SUBCUTANEOUS at 18:02

## 2021-02-26 RX ADMIN — ONDANSETRON 4 MG: 2 INJECTION INTRAMUSCULAR; INTRAVENOUS at 14:40

## 2021-02-26 RX ADMIN — FENTANYL CITRATE 100 MCG: 50 INJECTION, SOLUTION INTRAMUSCULAR; INTRAVENOUS at 14:07

## 2021-02-26 RX ADMIN — HEPARIN SODIUM 2000 UNITS: 1000 INJECTION INTRAVENOUS; SUBCUTANEOUS at 16:03

## 2021-02-26 RX ADMIN — GLYCOPYRROLATE 0.2 MG: 0.2 INJECTION, SOLUTION INTRAMUSCULAR; INTRAVENOUS at 14:40

## 2021-02-26 RX ADMIN — VERAPAMIL HYDROCHLORIDE 10 MG: 2.5 INJECTION, SOLUTION INTRAVENOUS at 16:00

## 2021-02-26 RX ADMIN — PHENYLEPHRINE HYDROCHLORIDE 100 MCG: 10 INJECTION INTRAVENOUS at 14:56

## 2021-02-26 RX ADMIN — HEPARIN SODIUM 6 BAG: 200 INJECTION, SOLUTION INTRAVENOUS at 16:15

## 2021-02-26 RX ADMIN — LIDOCAINE HYDROCHLORIDE 100 MG: 20 INJECTION, SOLUTION INFILTRATION; PERINEURAL at 14:07

## 2021-02-26 RX ADMIN — SODIUM CHLORIDE, POTASSIUM CHLORIDE, SODIUM LACTATE AND CALCIUM CHLORIDE: 600; 310; 30; 20 INJECTION, SOLUTION INTRAVENOUS at 13:53

## 2021-02-26 RX ADMIN — FENTANYL CITRATE 50 MCG: 50 INJECTION, SOLUTION INTRAMUSCULAR; INTRAVENOUS at 17:45

## 2021-02-26 RX ADMIN — HEPARIN SODIUM 7000 UNITS: 1000 INJECTION INTRAVENOUS; SUBCUTANEOUS at 14:45

## 2021-02-26 RX ADMIN — ROCURONIUM BROMIDE 20 MG: 10 INJECTION INTRAVENOUS at 15:47

## 2021-02-26 RX ADMIN — FENTANYL CITRATE 100 MCG: 50 INJECTION, SOLUTION INTRAMUSCULAR; INTRAVENOUS at 14:19

## 2021-02-26 RX ADMIN — LIDOCAINE HYDROCHLORIDE 5 ML: 10 INJECTION, SOLUTION EPIDURAL; INFILTRATION; INTRACAUDAL; PERINEURAL at 14:35

## 2021-02-26 RX ADMIN — ROCURONIUM BROMIDE 20 MG: 10 INJECTION INTRAVENOUS at 15:02

## 2021-02-26 RX ADMIN — ACETAMINOPHEN 650 MG: 325 TABLET, FILM COATED ORAL at 18:01

## 2021-02-26 ASSESSMENT — ACTIVITIES OF DAILY LIVING (ADL): ADLS_ACUITY_SCORE: 15

## 2021-02-26 ASSESSMENT — MIFFLIN-ST. JEOR
SCORE: 2107.88
SCORE: 2103.88

## 2021-02-26 ASSESSMENT — VISUAL ACUITY
OU: NORMAL ACUITY

## 2021-02-26 ASSESSMENT — ENCOUNTER SYMPTOMS: SEIZURES: 1

## 2021-02-26 NOTE — ANESTHESIA CARE TRANSFER NOTE
Patient: Emmanuel Leóntubyifan    Procedure(s):  ANESTHESIA OUT OF OR carotid cerebral angiogram bilateral embolisation @12:30    Diagnosis: AVM (arteriovenous malformation) brain [Q28.2]  Diagnosis Additional Information: No value filed.    Anesthesia Type:   General     Note:    Oropharynx: oropharynx clear of all foreign objects and spontaneously breathing  Level of Consciousness: awake  Oxygen Supplementation: face mask    Independent Airway: airway patency satisfactory and stable  Dentition: dentition unchanged  Vital Signs Stable: post-procedure vital signs reviewed and stable  Report to RN Given: handoff report given  Patient transferred to: PACU    Handoff Report: Identifed the Patient, Identified the Reponsible Provider, Reviewed the pertinent medical history, Discussed the surgical course, Reviewed Intra-OP anesthesia mangement and issues during anesthesia, Set expectations for post-procedure period and Allowed opportunity for questions and acknowledgement of understanding      Vitals: (Last set prior to Anesthesia Care Transfer)  CRNA VITALS  2/26/2021 1616 - 2/26/2021 1716      2/26/2021             Resp Rate (observed):  (!) 1        Electronically Signed By: HORTENCIA Thomas CRNA  February 26, 2021  5:18 PM

## 2021-02-26 NOTE — PROCEDURES
[facility]     Endovascular Surgical Neuroradiology Post-Procedure Note    Pre-Procedure Diagnosis:  Left temporal AVM with pre nidal aneurysm  Post-Procedure Diagnosis:  Same    Procedure(s):   Endovascular treatment of arteriovenous malformation    Findings:  [place] 18 embolization of Pre nidal aneurysm and parent vessel. Please refer to detail dictated report.    Plan:   -Goal SBP-120-150 mmHg.  -Head CT.   -Admit to ICU for overnight monitoring.    Primary Surgeon:  Dr. Gregory Sousa  Secondary Surgeon:  Not applicable  Secondary Surgeon Review:  None  Fellow:  Jg Padron QI,   Additional Assistants:  -    Prior to the start of the procedure and with procedural staff participation, I verbally confirmed: the patient s identity using two indicators, relevant allergies, that the procedure was appropriate and matched the consent or emergent situation, and that the correct equipment/implants were available. Immediately prior to starting the procedure I conducted the Time Out with the procedural staff and re-confirmed the patient s name, procedure, and site/side. (The Joint Commission universal protocol was followed.)  Yes    PRU value: Not applicable    Anesthesia:  Performed by Anesthesia  Medications:  Heparin, Verapamil  Puncture site:  Right Femoral Artery    Fluoroscopy time (minutes):  56.7  Radiation dose (mGy):  2861    Contrast amount (mL):  75     Estimated blood loss (mL):  Minimal    Closure:  Device 6F Angioseal.     Disposition:  Will be followed in hospital by the Neuro Endovascular team.        Sedation Post-Procedure Summary    Per note by anesthesia.     Nilay Gregorio MD  Pager:  [phone].

## 2021-02-26 NOTE — ANESTHESIA PROCEDURE NOTES
Airway   Date/Time: 2/26/2021 2:09 PM   Patient location during procedure: OR  Staff -   CRNA: Kassandra Wilson APRN CRNA  Performed By: CRNA    Consent for Airway   Urgency: elective    Indications and Patient Condition  Indications for airway management: jorge-procedural  Induction type:intravenousMask difficulty assessment: 1 - vent by mask    Final Airway Details  Final airway type: endotracheal airway  Successful airway:ETT - single  Endotracheal Airway Details   ETT size (mm): 8.0  Cuffed: yes  Successful intubation technique: direct laryngoscopy  Grade View of Cords: 1  Adjucts: stylet  Measured from: lips  Secured at (cm): 23  Secured with: pink tape  Bite block used: None    Post intubation assessment   Placement verified by: capnometry, equal breath sounds and chest rise   Number of attempts at approach: 1  Secured with:pink tape  Ease of procedure: easy  Dentition: Intact and Unchanged

## 2021-02-26 NOTE — ANESTHESIA PROCEDURE NOTES
Arterial Line Procedure Note    Staff -   Anesthesiologist:  Daisy Prasad MD  Performed By: anesthesiologist  Location: In OR After Induction  Procedure Start/Stop Times:     patient identified, IV checked, site marked, risks and benefits discussed, informed consent, monitors and equipment checked, pre-op evaluation and at physician/surgeon's request      Correct Patient: Yes      Correct Position: Yes      Correct Site: Yes      Correct Procedure: Yes      Correct Laterality:  Yes    Site Marked:  Yes  Line Placement:     Procedure:  Arterial Line    Insertion Site:  Radial    Insertion laterality:  Left    Skin Prep: Chloraprep      Patient Prep: patient draped      Ultrasound Guided?: No      Catheter size:  20 gauge, Quick cath    Dressing:  Tegaderm    Complications:  None obvious    Arterial waveform: Yes

## 2021-02-26 NOTE — OR NURSING
Pt extremely anxious pre op. Was given 2 mg of versed by CRNA. Pt attached to pulse oximeter. Family in room lights dimmed.

## 2021-02-26 NOTE — IR NOTE
Patient Name: Emmanuel Ballard  Medical Record Number: [id number]  Today's Date: 2/26/2021    Procedure: cerebral angiogram with intervention  Proceduralist: Vito Sousa Qi    Procedure Start: 1435  Procedure end: 1620  Sedation medications administered: general anesthesia adminstered by      Report given to: PACU RN  : n/a    Other Notes: Pt arrived to IR room 3 from . Consent reviewed. Pt denies any questions or concerns regarding procedure. Pt positioned supine and monitored per protocol. Pt tolerated procedure without any noted complications. Pt transferred back to PACU via CRNA.    Sheath Removed at 1620, right pedal pulse +1.  Pt to stay flat unit 1820.  Dr Lux and Dr Padron at bedside with CRNA during extubation and unitl pt left the room.

## 2021-02-26 NOTE — ANESTHESIA PREPROCEDURE EVALUATION
Anesthesia Pre-Procedure Evaluation    Patient: Emmanuel Ballard   MRN: [id number] : 1990        Preoperative Diagnosis: AVM (arteriovenous malformation) brain [Q28.2]   Procedure : Procedure(s):  ANESTHESIA OUT OF OR carotid cerebral angiogram bilateral embolisation @12:30     Past Medical History:   Diagnosis Date     Depression      Seizures (H)      Sleep apnea       Past Surgical History:   Procedure Laterality Date     IR CAROTID CEREBRAL ANGIOGRAM BILATERAL  2021      No Known Allergies   Social History     Tobacco Use     Smoking status: Never Smoker     Smokeless tobacco: Never Used   Substance Use Topics     Alcohol use: Yes     Comment: occas       Wt Readings from Last 1 Encounters:   21 108 kg (238 lb 1.6 oz)        Anesthesia Evaluation            ROS/MED HX  ENT/Pulmonary:     (+) sleep apnea,     Neurologic:     (+) seizures,     Cardiovascular:       METS/Exercise Tolerance:     Hematologic:       Musculoskeletal:       GI/Hepatic:       Renal/Genitourinary:       Endo:       Psychiatric/Substance Use:       Infectious Disease:       Malignancy:       Other:            Physical Exam    Airway        Mallampati: II   TM distance: > 3 FB   Neck ROM: full   Mouth opening: > 3 cm    Respiratory Devices and Support         Dental           Cardiovascular             Pulmonary                   OUTSIDE LABS:  CBC:   Lab Results   Component Value Date    WBC 7.6 2021    WBC 10.8 2021    HGB 14.4 2021    HGB 14.1 2021    HCT 41.1 2021    HCT 40.6 2021     2021     2021     BMP:   Lab Results   Component Value Date     2021     2021    POTASSIUM 4.4 2021    POTASSIUM 3.7 2021    CHLORIDE 108 2021    CHLORIDE 106 2021    CO2 31 2021    CO2 27 2021    BUN 13 2021    BUN 15 2021    CR 1.12 2021    CR 1.11 2021    GLC 96 2021    GLC  98 02/11/2021     COAGS:   Lab Results   Component Value Date    PTT 24 02/10/2021    INR 0.90 02/22/2021     POC:   Lab Results   Component Value Date    BGM 90 02/26/2021     HEPATIC:   Lab Results   Component Value Date    ALBUMIN 4.0 02/10/2021    PROTTOTAL 7.4 02/10/2021    ALT 22 02/10/2021    AST 20 02/10/2021    ALKPHOS 43 02/10/2021    BILITOTAL 0.6 02/10/2021     OTHER:   Lab Results   Component Value Date    CAROL 9.2 02/22/2021    PHOS 3.9 02/11/2021    MAG 2.5 (H) 02/11/2021       Anesthesia Plan    ASA Status:  3      Anesthesia Type: General.   Induction: Intravenous.   Maintenance: Inhalation.        Consents    Anesthesia Plan(s) and associated risks, benefits, and realistic alternatives discussed. Questions answered and patient/representative(s) expressed understanding.     - Discussed with:  Patient         Postoperative Care    Pain management: IV analgesics.   PONV prophylaxis: Ondansetron (or other 5HT-3), Dexamethasone or Solumedrol     Comments:                Daisy Prasad MD

## 2021-02-26 NOTE — ANESTHESIA POSTPROCEDURE EVALUATION
Patient: Emmanuel Ballard    Procedure(s):  ANESTHESIA OUT OF OR carotid cerebral angiogram bilateral embolisation @12:30    Diagnosis:AVM (arteriovenous malformation) brain [Q28.2]  Diagnosis Additional Information: No value filed.    Anesthesia Type:  General    Note:  Disposition: Admission   Postop Pain Control: Uneventful            Sign Out: Well controlled pain   PONV:    Neuro/Psych: Uneventful            Sign Out: Acceptable/Baseline neuro status   Airway/Respiratory: Uneventful            Sign Out: Acceptable/Baseline resp. status   CV/Hemodynamics: Uneventful            Sign Out: Acceptable CV status   Other NRE:    DID A NON-ROUTINE EVENT OCCUR?          Last vitals:  Vitals:    02/26/21 1700 02/26/21 1715 02/26/21 1730   BP: 130/77 126/79 125/69   Pulse: 68 60 60   Resp:  12 12   Temp: 36.7  C (98  F)     SpO2: 100% 100% 99%       Last vitals prior to Anesthesia Care Transfer:  CRNA VITALS  2/26/2021 1616 - 2/26/2021 1716      2/26/2021             Resp Rate (observed):  (!) 1          Electronically Signed By: Urmila Lee MD  February 26, 2021  5:50 PM

## 2021-02-27 ENCOUNTER — APPOINTMENT (OUTPATIENT)
Dept: CT IMAGING | Facility: CLINIC | Age: 31
DRG: 025 | End: 2021-02-27
Attending: NEUROLOGICAL SURGERY
Payer: COMMERCIAL

## 2021-02-27 ENCOUNTER — PATIENT OUTREACH (OUTPATIENT)
Dept: CARE COORDINATION | Facility: CLINIC | Age: 31
End: 2021-02-27

## 2021-02-27 VITALS
HEIGHT: 73 IN | WEIGHT: 238.98 LBS | TEMPERATURE: 98.3 F | BODY MASS INDEX: 31.67 KG/M2 | HEART RATE: 51 BPM | OXYGEN SATURATION: 99 % | SYSTOLIC BLOOD PRESSURE: 108 MMHG | DIASTOLIC BLOOD PRESSURE: 59 MMHG | RESPIRATION RATE: 14 BRPM

## 2021-02-27 LAB
ANION GAP SERPL CALCULATED.3IONS-SCNC: 4 MMOL/L (ref 3–14)
BUN SERPL-MCNC: 9 MG/DL (ref 7–30)
CALCIUM SERPL-MCNC: 8.6 MG/DL (ref 8.5–10.1)
CHLORIDE SERPL-SCNC: 106 MMOL/L (ref 94–109)
CO2 SERPL-SCNC: 26 MMOL/L (ref 20–32)
CREAT SERPL-MCNC: 0.89 MG/DL (ref 0.66–1.25)
ERYTHROCYTE [DISTWIDTH] IN BLOOD BY AUTOMATED COUNT: 12.4 % (ref 10–15)
GFR SERPL CREATININE-BSD FRML MDRD: >90 ML/MIN/{1.73_M2}
GLUCOSE SERPL-MCNC: 123 MG/DL (ref 70–99)
HCT VFR BLD AUTO: 37.2 % (ref 40–53)
HGB BLD-MCNC: 13.2 G/DL (ref 13.3–17.7)
MAGNESIUM SERPL-MCNC: 2 MG/DL (ref 1.6–2.3)
MCH RBC QN AUTO: 31.8 PG (ref 26.5–33)
MCHC RBC AUTO-ENTMCNC: 35.5 G/DL (ref 31.5–36.5)
MCV RBC AUTO: 90 FL (ref 78–100)
PHOSPHATE SERPL-MCNC: 4.1 MG/DL (ref 2.5–4.5)
PLATELET # BLD AUTO: 247 10E9/L (ref 150–450)
POTASSIUM SERPL-SCNC: 4 MMOL/L (ref 3.4–5.3)
RBC # BLD AUTO: 4.15 10E12/L (ref 4.4–5.9)
SODIUM SERPL-SCNC: 136 MMOL/L (ref 133–144)
WBC # BLD AUTO: 11.7 10E9/L (ref 4–11)

## 2021-02-27 PROCEDURE — 80048 BASIC METABOLIC PNL TOTAL CA: CPT | Performed by: NEUROLOGICAL SURGERY

## 2021-02-27 PROCEDURE — 70450 CT HEAD/BRAIN W/O DYE: CPT | Mod: 26 | Performed by: RADIOLOGY

## 2021-02-27 PROCEDURE — 83735 ASSAY OF MAGNESIUM: CPT | Performed by: NEUROLOGICAL SURGERY

## 2021-02-27 PROCEDURE — 85027 COMPLETE CBC AUTOMATED: CPT | Performed by: NEUROLOGICAL SURGERY

## 2021-02-27 PROCEDURE — 84100 ASSAY OF PHOSPHORUS: CPT | Performed by: NEUROLOGICAL SURGERY

## 2021-02-27 PROCEDURE — 70450 CT HEAD/BRAIN W/O DYE: CPT

## 2021-02-27 PROCEDURE — 250N000013 HC RX MED GY IP 250 OP 250 PS 637: Performed by: STUDENT IN AN ORGANIZED HEALTH CARE EDUCATION/TRAINING PROGRAM

## 2021-02-27 RX ADMIN — LEVETIRACETAM 750 MG: 750 TABLET, FILM COATED ORAL at 07:46

## 2021-02-27 RX ADMIN — FLUOXETINE 30 MG: 20 CAPSULE ORAL at 07:43

## 2021-02-27 RX ADMIN — ASPIRIN 325 MG ORAL TABLET 325 MG: 325 PILL ORAL at 07:46

## 2021-02-27 ASSESSMENT — VISUAL ACUITY
OU: NORMAL ACUITY

## 2021-02-27 ASSESSMENT — ACTIVITIES OF DAILY LIVING (ADL)
ADLS_ACUITY_SCORE: 15

## 2021-02-27 ASSESSMENT — MIFFLIN-ST. JEOR: SCORE: 2107.88

## 2021-02-27 NOTE — PLAN OF CARE
Admitted/transferred from: PACU  Reason for admission/transfer: post procedure monitoring  2 RN skin assessment: completed by Hesham SALCEDO  Result of skin assessment and interventions/actions: see charting  Height, weight, drug calc weight: done  Patient belongings (see Flowsheet)  MDRO education added to care plan: n/a  ?

## 2021-02-27 NOTE — PLAN OF CARE
Major Shift Events: Q1h neuro exam unchanged and intact. Complains of a mild headache. PRN Tylenol given. Groin site WNL. Bradycardic. Room air. Systolic BP within 120-150 goal without interventions. Regular diet and excellent PO liquid intact. Voiding with urinal. No BM. Electrolytes WNL.    Plan: Continue with current POC.    For vital signs and complete assessments, please see documentation flowsheets.

## 2021-02-27 NOTE — DISCHARGE SUMMARY
[facility] Discharge Summary and Instructions    Emmanuel Ballard MRN# [id number]   Age: 30 year old YOB: 1990     Date of Admission:  2/26/2021  Date of Discharge::  2/27/2021  Admitting Physician:  Gregory Sousa MD  Discharge Physician:  Gregory Sousa MD          Admission Diagnoses:   AVM (arteriovenous malformation) brain [Q28.2]          Discharge Diagnosis:   AVM (arteriovenous malformation) brain [Q28.2]          Procedures:   2/26:  Cerebral angiogram with embolization of left AVM           Brief History of Illness:   30-year-old male with the history of  arteriovenous malformation status post radiation in 2015 then in 2019  for residual. He presented to [facility] initially with migraines  and seizure-like activity in mid-Feb. CT was conducted that showed small  intraparenchymal hemorrhage in the left middle temporal lobe  consistent where previous AVM was. 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 He was then  transferred to [facility] for definitive care at that time. A superselective angiogram was conducted that showed a single pedicle to the residual AVM and aneurysm.  Decision was made to electively treat the AVM via embolization.  Patient has elected to undergo above-mentioned procedure.             Hospital Course:   Patient underwent above-mentioned procedure on 2/26. The operation was uncomplicated and he was admitted to the surgical ICU for routine post operative cares. Post-op CT was unremarkable.  On post operative day 1 he was doing well ambulating, voiding without a wagner, eating a regular diet, pain was well controlled and therefore he was discharged home.  Neuro intact including complex speech.  He will continue keppra for 3 months with a follow up angiogram in 3 months time to determine d/c'ing keppra and any   AVM residual.             Discharge Medications:     Current Discharge Medication List      CONTINUE these medications which have NOT CHANGED    Details   FLUoxetine (PROZAC) 10 MG capsule Take 30 mg by mouth daily      levETIRAcetam (KEPPRA) 750 MG tablet Take 1 tablet (750 mg) by mouth 2 times daily  Qty: 60 tablet, Refills: 3    Associated Diagnoses: Arteriovenous malformation of brain      LORazepam (ATIVAN) 0.5 MG tablet Take 0.5 mg by mouth every 6 hours as needed for anxiety      MULTIPLE VITAMIN-FOLIC ACID PO Take 1 tablet by mouth daily                     Discharge Instructions and Follow-Up:   Discharge diet: Regular   Discharge activity: You may advance activity as tolerated. No strenuous exercise or heavy lifting greater than 10 lbs for 4 weeks or until seen and cleared in clinic.   Discharge follow-up: Follow-up with Dr. Gregory Sousa MD in 1 month with virtual visit and 3 months with repeat angiogram.    Wound care: Ok to shower,however no scrubbing of the wound and no soaking of the wound, meaning no bathtubs or swimming pools.  Pat dry only. Leave wound open to air.        Please call if you have:  1. increased pain, redness, drainage, swelling at your incision.  2. fevers > 101.5 F degrees.  3. with any questions or concerns.  You may reach the Neuro-IR clinic at [phone] during regular work hours. ER at [phone].    and ask for the Neurosurgery Resident on call at [phone], during off hours or weekends.         Discharge Disposition:   Discharged to home

## 2021-02-27 NOTE — PLAN OF CARE
Discharged to: home at 0950, girlfriend provided transport  Belongings: sent with pt  AVS (After Visit Summary) discussed with: patient and girlfriend

## 2021-03-01 NOTE — PROGRESS NOTES
[facility]: Post-Discharge Note  SITUATION                                                      Admission:    Admission Date: 02/26/21   Reason for Admission: AVM (arteriovenous malformation) brain  Discharge:   Discharge Date: 02/27/21  Discharge Diagnosis: AVM (arteriovenous malformation) brain    BACKGROUND                                                      30-year-old male with the history of  arteriovenous malformation status post radiation in 2015 then in 2019  for residual. He presented to [facility] initially with migraines  and seizure-like activity in mid-Feb. CT was conducted that showed small  intraparenchymal hemorrhage in the left middle temporal lobe  consistent where previous AVM was. 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 He was then  transferred to [facility] for definitive care at that time. A superselective angiogram was conducted that showed a single pedicle to the residual AVM and aneurysm.  Decision was made to electively treat the AVM via embolization.  Patient has elected to undergo above-mentioned procedure.      ASSESSMENT      Discharge Assessment  Patient reports symptoms are: Improved  Does the patient have all of their medications?: Yes  Does patient know what their new medications are for?: Yes  Does patient have a follow-up appointment scheduled?: No  Does patient have any other questions or concerns?: No    Post-op  Did the patient have surgery or a procedure: Yes  Incision: healing  Drainage: No  Bleeding: none  Fever: No  Chills: No  Redness: No  Warmth: No  Swelling: No  Incision site pain: No  Eating & Drinking: unable to tolerate solid foods  PO Intake: soft foods  Bowel Function: constipation  Urinary Status: voiding without complaint/concerns        PLAN                                                      Outpatient Plan:    Follow-up with   Gregory Sousa MD in 1 month with virtual visit and 3 months with repeat angiogram.     No future appointments.        Charla Javed CMA

## 2021-03-02 ENCOUNTER — TELEPHONE (OUTPATIENT)
Dept: NEUROSURGERY | Facility: CLINIC | Age: 31
End: 2021-03-02

## 2021-03-02 NOTE — DISCHARGE SUMMARY
[facility] Discharge Summary and Instructions    Emmanuel Ballard MRN# [id number]   Age: 30 year old YOB: 1990     Date of Admission:  2/26/2021  Date of Discharge::  2/27/2021  Admitting Physician:  Gregory Sousa MD  Discharge Physician:  Gregory Sousa MD          Admission Diagnoses:   AVM (arteriovenous malformation) brain [Q28.2]  Cerebral edema secondary to above  Seizures secondary to above          Discharge Diagnosis:   AVM (arteriovenous malformation) brain [Q28.2]  Cerebral edema secondary to above  Seizures secondary to above          Procedures:   2/26:  Cerebral angiogram with embolization of left AVM           Brief History of Illness:   30-year-old male with the history of  arteriovenous malformation status post radiation in 2015 then in 2019  for residual. He presented to [facility] initially with migraines  and seizure-like activity in mid-Feb. CT was conducted that showed small  intraparenchymal hemorrhage in the left middle temporal lobe  consistent where previous AVM was. 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 He was then  transferred to [facility] for definitive care at that time. A superselective angiogram was conducted that showed a single pedicle to the residual AVM and aneurysm.  Decision was made to electively treat the AVM via embolization.  Patient has elected to undergo above-mentioned procedure.             Hospital Course:   Patient underwent above-mentioned procedure on 2/26. The operation was uncomplicated and he was admitted to the surgical ICU for routine post operative cares. Post-op CT was unremarkable.  On post operative day 1 he was doing well ambulating, voiding without a wagner, eating a regular diet, pain was well controlled and therefore he was discharged home.  Neuro intact including complex speech.  He  will continue keppra for 3 months with a follow up angiogram in 3 months time to determine d/c'ing keppra and any   AVM residual.            Discharge Medications:     Discharge Medication List as of 2/27/2021  9:15 AM      CONTINUE these medications which have NOT CHANGED    Details   FLUoxetine (PROZAC) 10 MG capsule Take 30 mg by mouth daily, Historical      levETIRAcetam (KEPPRA) 750 MG tablet Take 1 tablet (750 mg) by mouth 2 times daily, Disp-60 tablet, R-3, E-Prescribe      LORazepam (ATIVAN) 0.5 MG tablet Take 0.5 mg by mouth every 6 hours as needed for anxiety, Historical      MULTIPLE VITAMIN-FOLIC ACID PO Take 1 tablet by mouth daily, Historical                     Discharge Instructions and Follow-Up:   Discharge diet: Regular   Discharge activity: You may advance activity as tolerated. No strenuous exercise or heavy lifting greater than 10 lbs for 4 weeks or until seen and cleared in clinic.   Discharge follow-up: Follow-up with Dr. Gregory Sousa MD in 1 month with virtual visit and 3 months with repeat angiogram.    Wound care: Ok to shower,however no scrubbing of the wound and no soaking of the wound, meaning no bathtubs or swimming pools.  Pat dry only. Leave wound open to air.        Please call if you have:  1. increased pain, redness, drainage, swelling at your incision.  2. fevers > 101.5 F degrees.  3. with any questions or concerns.  You may reach the Neuro-IR clinic at [phone] during regular work hours. ER at [phone].    and ask for the Neurosurgery Resident on call at [phone], during off hours or weekends.         Discharge Disposition:   Discharged to home

## 2021-03-03 ENCOUNTER — TELEPHONE (OUTPATIENT)
Dept: NEUROSURGERY | Facility: CLINIC | Age: 31
End: 2021-03-03

## 2021-03-04 ENCOUNTER — TELEPHONE (OUTPATIENT)
Dept: NEUROSURGERY | Facility: CLINIC | Age: 31
End: 2021-03-04

## 2021-03-24 ENCOUNTER — VIRTUAL VISIT (OUTPATIENT)
Dept: NEUROSURGERY | Facility: CLINIC | Age: 31
End: 2021-03-24
Payer: COMMERCIAL

## 2021-03-24 DIAGNOSIS — Q28.2 AVM (ARTERIOVENOUS MALFORMATION) BRAIN: Primary | ICD-10-CM

## 2021-03-24 PROCEDURE — 99212 OFFICE O/P EST SF 10 MIN: CPT | Mod: TEL | Performed by: NEUROLOGICAL SURGERY

## 2021-03-24 NOTE — LETTER
3/24/2021       RE: Emmanuel Ballard  [address]  [place] [address]     Dear Colleague,    Thank you for referring your patient, Emmanuel Ballard, to the [facility]UROS[facility] [place] at [facility]. Please see a copy of my visit note below.    mEmanuel is a 30 year old who is being evaluated via a billable telephone visit.      What phone number would you like to be contacted at? [phone]  How would you like to obtain your AVS? Mail a copy  Phone call duration: 15 minutes    Chief Complaint   Patient presents with     RECHECK     TELEPHONE VISIT RETURN      Doug Heredia      Service Date: 2021      Aspen Delgado MD   [address], [address]   [place]  [address]       RE: Emmanuel Ballard   MRN: [id number]   : 1990      Dear Dr. Delgado:      I had the pleasure to talk to Feliberto today by telephone during a neurosurgical phone clinic visit.      Briefly, Feliberto is a 30-year-old gentleman that had presented to the [facility] in early .  When he had presented, he had had a seizure and was transferred from an outside hospital.  A CAT scan demonstrated a hemorrhage in the left parietal region.  We performed a cerebral angiogram on 2021 that demonstrated a small residual AVM off of one of the distal M2 branches.  This was associated with a venous aneurysm.  At the time, our impression was that the AVM, which had previously been radiated and was shrinking, had hemorrhaged and that it was not the aneurysm that had hemorrhaged.  We talked to him about treatment options including surgical removal versus endovascular embolization.  He was discharged from the hospital and thought about his treatment options and ultimately elected to proceed with endovascular embolization of his AVM.  He returned to the hospital about 2 weeks later, and we embolized the  remainder of the AVM on 2021.  This was done without complication.  Following embolization, we felt that we had complete cure of the AVM.  He was hospitalized for a day and then discharged home.      Today he is returning for routine followup.  Since his discharge, he feels that he has been doing really well without any neurologic sequela.  He is back at work, he is very active, he is working out and again doing very well.      I did feel that there was complete cure with our embolization.  I would like to bring him back for a diagnostic cerebral angiogram that would be performed in 6 months after the treatment.  I will plan to bring him back in August of this coming summer for a diagnostic angiogram to confirm that there hasn't been any regrowth of the AVM.      Overall, I spent approximately 15 minutes on the phone with Feliberto, with the majority of this time spent in consultation and developing a treatment plan.      Sincerely,      Gregory Sousa MD      D: 2021   T: 2021   MT: JATIN      Name:     JEREMIE KHOURY   MRN:      [id number]        Account:      [id number]   :      1990           Service Date: 2021      Document: [id number]

## 2021-03-24 NOTE — PROGRESS NOTES
Service Date: 2021      Aspen Delgado MD   [facility]   [address], [address]   [place] [address]       RE: Emmanuel Ballard   MRN: [id number]   : 1990      Dear Dr. Delgado:      I had the pleasure to talk to Feliberto today by telephone during a neurosurgical phone clinic visit.      Briefly, Feliberto is a 30-year-old gentleman that had presented to the [facility] in early .  When he had presented, he had had a seizure and was transferred from an outside hospital.  A CAT scan demonstrated a hemorrhage in the left parietal region.  We performed a cerebral angiogram on 2021 that demonstrated a small residual AVM off of one of the distal M2 branches.  This was associated with a venous aneurysm.  At the time, our impression was that the AVM, which had previously been radiated and was shrinking, had hemorrhaged and that it was not the aneurysm that had hemorrhaged.  We talked to him about treatment options including surgical removal versus endovascular embolization.  He was discharged from the hospital and thought about his treatment options and ultimately elected to proceed with endovascular embolization of his AVM.  He returned to the hospital about 2 weeks later, and we embolized the remainder of the AVM on 2021.  This was done without complication.  Following embolization, we felt that we had complete cure of the AVM.  He was hospitalized for a day and then discharged home.      Today he is returning for routine followup.  Since his discharge, he feels that he has been doing really well without any neurologic sequela.  He is back at work, he is very active, he is working out and again doing very well.      I did feel that there was complete cure with our embolization.  I would like to bring him back for a diagnostic cerebral angiogram that would be performed in 6 months after the treatment.  I will plan to bring him back in August of this  coming summer for a diagnostic angiogram to confirm that there hasn't been any regrowth of the AVM.      Overall, I spent approximately 15 minutes on the phone with Feliberto, with the majority of this time spent in consultation and developing a treatment plan.      Sincerely,            MD DELMY Almanza MD             D: 2021   T: 2021   MT: JATIN      Name:     JEREMIE KHOURY   MRN:      [id number]        Account:      [id number]   :      1990           Service Date: 2021      Document: [id number]

## 2021-03-24 NOTE — PROGRESS NOTES
Emmanuel is a 30 year old who is being evaluated via a billable telephone visit.      What phone number would you like to be contacted at? [phone]  How would you like to obtain your AVS? Mail a copy  Phone call duration: 15 minutes    Chief Complaint   Patient presents with     RECHECK     TELEPHONE VISIT RETURN      Doug Heredia      Please see dictation for visit details.

## 2021-03-25 NOTE — PATIENT INSTRUCTIONS
Follow up with Cerebral Diagnostic Angiogram in August 2021.    Víctor REDDING will contact patient closer August.    Call Dolly RN for questions/concerns     Thank you for using [facility]

## 2021-07-29 ENCOUNTER — TELEPHONE (OUTPATIENT)
Dept: NEUROLOGY | Facility: CLINIC | Age: 31
End: 2021-07-29

## 2021-07-29 DIAGNOSIS — Q28.2 AVM (ARTERIOVENOUS MALFORMATION) BRAIN: Primary | ICD-10-CM

## 2021-07-29 NOTE — TELEPHONE ENCOUNTER
Patient due for angio 8/2021 to follow-up on AVM.     States he is doing well. Occasional headache. Denies seizures.     Informed:  Scheduling will reach out to arrange in the next few weeks at [facility].   Will be at hospital for 5-6 hours.  Will need  home.  Will need COVID test 2-4 days prior.  Visitor policy.   Will call after scheduled, closer to appointment, and mail instructions.     Patient verbalized understanding. Patient has my contact information and was encouraged to call with questions/concerns. Lin Alberts RN 7/29/2021 1:01 PM

## 2021-08-05 ENCOUNTER — TELEPHONE (OUTPATIENT)
Dept: NEUROSURGERY | Facility: CLINIC | Age: 31
End: 2021-08-05

## 2021-08-09 ENCOUNTER — TELEPHONE (OUTPATIENT)
Dept: NEUROSURGERY | Facility: CLINIC | Age: 31
End: 2021-08-09

## 2021-08-09 DIAGNOSIS — Z11.59 ENCOUNTER FOR SCREENING FOR OTHER VIRAL DISEASES: ICD-10-CM

## 2021-08-09 NOTE — TELEPHONE ENCOUNTER
Emmanuel is on for 9/17/21 for an angiogram with Dr Sousa at 8:30am at [facility]. Covid test on 9/13/21. Patient is aware.

## 2021-08-23 ENCOUNTER — PATIENT OUTREACH (OUTPATIENT)
Dept: NEUROLOGY | Facility: CLINIC | Age: 31
End: 2021-08-23

## 2021-08-23 NOTE — PATIENT INSTRUCTIONS
You are scheduled for a Diagnostic Cerebral Angiogram with Dr. Sousa on 9/17/21 at 8:30 AM.     Please follow these instructions:    * Prior to your procedure you will need to obtain COVID-19 testing within 2-4 days of your scheduled procedure. You are scheduled for 9/13/21 at 3:15 PM at [facility], located at [address] [place] [phone]. Their contact number is [phone]. You will also need to obtain blood work (which will be ordered by Dr. Hua or Dr. Zamora) at time of COVID testing. Scheduling will contact you to arrange your blood work. If you have not been contacted within 7 days of your procedure please reach out to scheduling at [phone] to schedule blood work 2-4 days prior to your procedure.      * You should arrive at the [facility] waiting room at the [facility] at 7:00 AM. The address is [address] [place] [address]. The phone number is [phone]. A map is enclosed.    * Do not eat after midnight; you may drink clear liquids (includes water, Jell-O, clear broth, apple juice or any non-carbonated beverage that you can see through) until 6:30 AM.     * You may take your medications with a sip of water the morning of the procedure.     * You will be discharged the same day. You must have a  home and someone that can stay with you through the night.     PLEASE NOTE our COVID-19 visitors policy: For the protection of our patients and visitors, patients are allowed one consistent visitor, 18 years of age or older, per adult patient in the hospital. Visitors must wear a mask at all times while in the hospital.     All discharge instructions will be given to the  or volunteer. Documentation for the post-operative plan will be given to the patient and . Patients are required to have someone to stay with them for 24 hours after their procedure.    If you have questions regarding your procedure, please  contact me at [phone], option 3.    If you need to cancel, reschedule or have procedure scheduling related questions, please call Swetha at [phone].    Thank you,  Víctor Alberts RN, CNRN  Stroke & Endovascular Care Coordinator

## 2021-08-24 DIAGNOSIS — Q28.2 AVM (ARTERIOVENOUS MALFORMATION) BRAIN: Primary | ICD-10-CM

## 2021-09-13 ENCOUNTER — LAB (OUTPATIENT)
Dept: LAB | Facility: CLINIC | Age: 31
End: 2021-09-13

## 2021-09-13 ENCOUNTER — LAB (OUTPATIENT)
Dept: LAB | Facility: CLINIC | Age: 31
End: 2021-09-13
Payer: COMMERCIAL

## 2021-09-13 DIAGNOSIS — Q28.2 AVM (ARTERIOVENOUS MALFORMATION) BRAIN: ICD-10-CM

## 2021-09-13 DIAGNOSIS — Z11.59 ENCOUNTER FOR SCREENING FOR OTHER VIRAL DISEASES: ICD-10-CM

## 2021-09-13 LAB
ANION GAP SERPL CALCULATED.3IONS-SCNC: 3 MMOL/L (ref 3–14)
APTT PPP: 25 SECONDS (ref 22–38)
BASOPHILS # BLD AUTO: 0.1 10E3/UL (ref 0–0.2)
BASOPHILS NFR BLD AUTO: 1 %
BUN SERPL-MCNC: 10 MG/DL (ref 7–30)
CALCIUM SERPL-MCNC: 9.1 MG/DL (ref 8.5–10.1)
CHLORIDE BLD-SCNC: 109 MMOL/L (ref 94–109)
CO2 SERPL-SCNC: 27 MMOL/L (ref 20–32)
CREAT SERPL-MCNC: 1.01 MG/DL (ref 0.66–1.25)
EOSINOPHIL # BLD AUTO: 0.3 10E3/UL (ref 0–0.7)
EOSINOPHIL NFR BLD AUTO: 3 %
ERYTHROCYTE [DISTWIDTH] IN BLOOD BY AUTOMATED COUNT: 12.1 % (ref 10–15)
GFR SERPL CREATININE-BSD FRML MDRD: >90 ML/MIN/1.73M2
GLUCOSE BLD-MCNC: 94 MG/DL (ref 70–99)
HCT VFR BLD AUTO: 40.6 % (ref 40–53)
HGB BLD-MCNC: 14.5 G/DL (ref 13.3–17.7)
IMM GRANULOCYTES # BLD: 0 10E3/UL
IMM GRANULOCYTES NFR BLD: 0 %
INR PPP: 1.07 (ref 0.85–1.15)
LYMPHOCYTES # BLD AUTO: 1.9 10E3/UL (ref 0.8–5.3)
LYMPHOCYTES NFR BLD AUTO: 23 %
MCH RBC QN AUTO: 31.5 PG (ref 26.5–33)
MCHC RBC AUTO-ENTMCNC: 35.7 G/DL (ref 31.5–36.5)
MCV RBC AUTO: 88 FL (ref 78–100)
MONOCYTES # BLD AUTO: 0.7 10E3/UL (ref 0–1.3)
MONOCYTES NFR BLD AUTO: 8 %
NEUTROPHILS # BLD AUTO: 5.5 10E3/UL (ref 1.6–8.3)
NEUTROPHILS NFR BLD AUTO: 65 %
NRBC # BLD AUTO: 0 10E3/UL
NRBC BLD AUTO-RTO: 0 /100
PLATELET # BLD AUTO: 250 10E3/UL (ref 150–450)
POTASSIUM BLD-SCNC: 4.2 MMOL/L (ref 3.4–5.3)
RBC # BLD AUTO: 4.6 10E6/UL (ref 4.4–5.9)
SODIUM SERPL-SCNC: 139 MMOL/L (ref 133–144)
WBC # BLD AUTO: 8.5 10E3/UL (ref 4–11)

## 2021-09-13 PROCEDURE — U0005 INFEC AGEN DETEC AMPLI PROBE: HCPCS

## 2021-09-13 PROCEDURE — U0003 INFECTIOUS AGENT DETECTION BY NUCLEIC ACID (DNA OR RNA); SEVERE ACUTE RESPIRATORY SYNDROME CORONAVIRUS 2 (SARS-COV-2) (CORONAVIRUS DISEASE [COVID-19]), AMPLIFIED PROBE TECHNIQUE, MAKING USE OF HIGH THROUGHPUT TECHNOLOGIES AS DESCRIBED BY CMS-2020-01-R: HCPCS

## 2021-09-13 PROCEDURE — 85730 THROMBOPLASTIN TIME PARTIAL: CPT

## 2021-09-13 PROCEDURE — 85025 COMPLETE CBC W/AUTO DIFF WBC: CPT

## 2021-09-13 PROCEDURE — 36415 COLL VENOUS BLD VENIPUNCTURE: CPT

## 2021-09-13 PROCEDURE — 85610 PROTHROMBIN TIME: CPT

## 2021-09-13 PROCEDURE — 80048 BASIC METABOLIC PNL TOTAL CA: CPT

## 2021-09-14 LAB — SARS-COV-2 RNA RESP QL NAA+PROBE: NEGATIVE

## 2021-09-17 ENCOUNTER — APPOINTMENT (OUTPATIENT)
Dept: INTERVENTIONAL RADIOLOGY/VASCULAR | Facility: CLINIC | Age: 31
End: 2021-09-17
Attending: NEUROLOGICAL SURGERY
Payer: COMMERCIAL

## 2021-09-17 ENCOUNTER — HOSPITAL ENCOUNTER (OUTPATIENT)
Facility: CLINIC | Age: 31
Discharge: HOME | End: 2021-09-17
Attending: NEUROLOGICAL SURGERY | Admitting: NEUROLOGICAL SURGERY
Payer: COMMERCIAL

## 2021-09-17 ENCOUNTER — APPOINTMENT (OUTPATIENT)
Dept: MEDSURG UNIT | Facility: CLINIC | Age: 31
End: 2021-09-17
Attending: NEUROLOGICAL SURGERY
Payer: COMMERCIAL

## 2021-09-17 VITALS
RESPIRATION RATE: 16 BRPM | OXYGEN SATURATION: 98 % | SYSTOLIC BLOOD PRESSURE: 138 MMHG | DIASTOLIC BLOOD PRESSURE: 65 MMHG | TEMPERATURE: 97.8 F | WEIGHT: 225 LBS | HEIGHT: 73 IN | BODY MASS INDEX: 29.82 KG/M2 | HEART RATE: 67 BPM

## 2021-09-17 DIAGNOSIS — Q28.2 AVM (ARTERIOVENOUS MALFORMATION) BRAIN: ICD-10-CM

## 2021-09-17 PROCEDURE — 99152 MOD SED SAME PHYS/QHP 5/>YRS: CPT

## 2021-09-17 PROCEDURE — 255N000002 HC RX 255 OP 636: Performed by: NEUROLOGICAL SURGERY

## 2021-09-17 PROCEDURE — 36224 PLACE CATH CAROTD ART: CPT

## 2021-09-17 PROCEDURE — C1887 CATHETER, GUIDING: HCPCS

## 2021-09-17 PROCEDURE — 36227 PLACE CATH XTRNL CAROTID: CPT

## 2021-09-17 PROCEDURE — 99153 MOD SED SAME PHYS/QHP EA: CPT

## 2021-09-17 PROCEDURE — 36224 PLACE CATH CAROTD ART: CPT | Mod: LT | Performed by: NEUROLOGICAL SURGERY

## 2021-09-17 PROCEDURE — C1769 GUIDE WIRE: HCPCS

## 2021-09-17 PROCEDURE — 272N000570 HC SHEATH CR7

## 2021-09-17 PROCEDURE — 36227 PLACE CATH XTRNL CAROTID: CPT | Mod: LT | Performed by: NEUROLOGICAL SURGERY

## 2021-09-17 PROCEDURE — 272N000280 HC DEVICE COMPRESSION CR5

## 2021-09-17 PROCEDURE — 250N000009 HC RX 250

## 2021-09-17 PROCEDURE — 250N000011 HC RX IP 250 OP 636

## 2021-09-17 PROCEDURE — 999N000134 HC STATISTIC PP CARE STAGE 3

## 2021-09-17 PROCEDURE — 99152 MOD SED SAME PHYS/QHP 5/>YRS: CPT | Mod: GC | Performed by: NEUROLOGICAL SURGERY

## 2021-09-17 PROCEDURE — 258N000003 HC RX IP 258 OP 636

## 2021-09-17 RX ORDER — FLUMAZENIL 0.1 MG/ML
0.2 INJECTION, SOLUTION INTRAVENOUS
Status: DISCONTINUED | OUTPATIENT
Start: 2021-09-17 | End: 2021-09-17 | Stop reason: HOSPADM

## 2021-09-17 RX ORDER — HEPARIN SODIUM 200 [USP'U]/100ML
1 INJECTION, SOLUTION INTRAVENOUS CONTINUOUS PRN
Status: DISCONTINUED | OUTPATIENT
Start: 2021-09-17 | End: 2021-09-17 | Stop reason: HOSPADM

## 2021-09-17 RX ORDER — MULTIVITAMIN WITH IRON
1 TABLET ORAL DAILY
COMMUNITY

## 2021-09-17 RX ORDER — IODIXANOL 320 MG/ML
150 INJECTION, SOLUTION INTRAVASCULAR ONCE
Status: COMPLETED | OUTPATIENT
Start: 2021-09-17 | End: 2021-09-17

## 2021-09-17 RX ORDER — SODIUM CHLORIDE 9 MG/ML
INJECTION, SOLUTION INTRAVENOUS CONTINUOUS
Status: DISCONTINUED | OUTPATIENT
Start: 2021-09-17 | End: 2021-09-17 | Stop reason: HOSPADM

## 2021-09-17 RX ORDER — NALOXONE HYDROCHLORIDE 0.4 MG/ML
0.2 INJECTION, SOLUTION INTRAMUSCULAR; INTRAVENOUS; SUBCUTANEOUS
Status: DISCONTINUED | OUTPATIENT
Start: 2021-09-17 | End: 2021-09-17 | Stop reason: HOSPADM

## 2021-09-17 RX ORDER — NALOXONE HYDROCHLORIDE 0.4 MG/ML
0.4 INJECTION, SOLUTION INTRAMUSCULAR; INTRAVENOUS; SUBCUTANEOUS
Status: DISCONTINUED | OUTPATIENT
Start: 2021-09-17 | End: 2021-09-17 | Stop reason: HOSPADM

## 2021-09-17 RX ORDER — LIDOCAINE 40 MG/G
CREAM TOPICAL
Status: DISCONTINUED | OUTPATIENT
Start: 2021-09-17 | End: 2021-09-17 | Stop reason: HOSPADM

## 2021-09-17 RX ORDER — FENTANYL CITRATE 50 UG/ML
25-50 INJECTION, SOLUTION INTRAMUSCULAR; INTRAVENOUS EVERY 5 MIN PRN
Status: DISCONTINUED | OUTPATIENT
Start: 2021-09-17 | End: 2021-09-17 | Stop reason: HOSPADM

## 2021-09-17 RX ORDER — IBUPROFEN 200 MG
400 TABLET ORAL EVERY 6 HOURS PRN
COMMUNITY

## 2021-09-17 RX ADMIN — LIDOCAINE HYDROCHLORIDE 1 ML: 10 INJECTION, SOLUTION EPIDURAL; INFILTRATION; INTRACAUDAL; PERINEURAL at 10:00

## 2021-09-17 RX ADMIN — FENTANYL CITRATE 50 MCG: 50 INJECTION, SOLUTION INTRAMUSCULAR; INTRAVENOUS at 10:00

## 2021-09-17 RX ADMIN — MIDAZOLAM 0.5 MG: 1 INJECTION INTRAMUSCULAR; INTRAVENOUS at 10:05

## 2021-09-17 RX ADMIN — IODIXANOL 40 ML: 320 INJECTION, SOLUTION INTRAVASCULAR at 10:26

## 2021-09-17 RX ADMIN — SODIUM CHLORIDE: 9 INJECTION, SOLUTION INTRAVENOUS at 08:17

## 2021-09-17 RX ADMIN — FENTANYL CITRATE 25 MCG: 50 INJECTION, SOLUTION INTRAMUSCULAR; INTRAVENOUS at 10:05

## 2021-09-17 RX ADMIN — MIDAZOLAM 1 MG: 1 INJECTION INTRAMUSCULAR; INTRAVENOUS at 10:00

## 2021-09-17 RX ADMIN — HEPARIN SODIUM: 1000 INJECTION, SOLUTION INTRAVENOUS; SUBCUTANEOUS at 10:27

## 2021-09-17 ASSESSMENT — VISUAL ACUITY
OU: NORMAL ACUITY

## 2021-09-17 ASSESSMENT — MIFFLIN-ST. JEOR: SCORE: 2039.47

## 2021-09-17 NOTE — PROGRESS NOTES
Tolerated ambulation to BR. Able to void w/o difficulty. Discharge instructions reviewed w/ pt & pt's Natali NICHOLS. Questions & concerns addressed. Resting back in bed. Stable.

## 2021-09-17 NOTE — PROGRESS NOTES
[facility]     Endovascular Surgical Neuroradiology Pre-Procedure Note      HPI:  Emmanuel Ballard is a 31 year old right-hand-dominant male with a history of left frontoparietal Spetzler Cordell grade 2 AVM status post SRS in 2015 and 2019. He recently presented in February 2021 with migrainous headache and seizure activity on CT brain was noted to have a left superior temporal gyrus ICH.  Diagnosis of an angiogram confirmed an intracranial aneurysm being supplied by the inferior division of the M2 draining via the vein of Lyndsey to the transverse sigmoid junction.  He presented to have Alden 18 embolization of the internal aneurysm with a successful glue cast following the procedure on complete exclusion of the intranidal aneurysm.  He has presented today for a 6 months follow-up cerebral angiogram following his embolization procedure.  He is otherwise feeling well and is the first of 4 siblings.    Medical History:  Past Medical History:   Diagnosis Date     Depression      Seizures (H)      Sleep apnea        Surgical History:  Past Surgical History:   Procedure Laterality Date     IR CAROTID CEREBRAL ANGIOGRAM BILATERAL  2/11/2021     IR CAROTID CEREBRAL ANGIOGRAM BILATERAL  2/26/2021       Family History:  No family history on file.    Social History:  Social History     Socioeconomic History     Marital status: Single     Spouse name: Not on file     Number of children: Not on file     Years of education: Not on file     Highest education level: Not on file   Occupational History     Not on file   Tobacco Use     Smoking status: Never Smoker     Smokeless tobacco: Never Used   Substance and Sexual Activity     Alcohol use: Yes     Comment: occas      Drug use: No     Sexual activity: Yes     Partners: Female     Birth control/protection: Condom   Other Topics Concern     Parent/sibling w/ CABG, MI or angioplasty before 65F 55M? No   Social History  Narrative     Not on file     Social Determinants of Health     Financial Resource Strain:      Difficulty of Paying Living Expenses:    Food Insecurity:      Worried About Running Out of Food in the Last Year:      Ran Out of Food in the Last Year:    Transportation Needs:      Lack of Transportation (Medical):      Lack of Transportation (Non-Medical):    Physical Activity:      Days of Exercise per Week:      Minutes of Exercise per Session:    Stress:      Feeling of Stress :    Social Connections:      Frequency of Communication with Friends and Family:      Frequency of Social Gatherings with Friends and Family:      Attends Yarsani Services:      Active Member of Clubs or Organizations:      Attends Club or Organization Meetings:      Marital Status:    Intimate Partner Violence:      Fear of Current or Ex-Partner:      Emotionally Abused:      Physically Abused:      Sexually Abused:        Allergies:  No Known Allergies    Is there a contrast allergy?  No    Medications:  Medications Prior to Admission   Medication Sig Dispense Refill Last Dose     FLUoxetine (PROZAC) 10 MG capsule Take 30 mg by mouth daily        levETIRAcetam (KEPPRA) 750 MG tablet Take 1 tablet (750 mg) by mouth 2 times daily 60 tablet 3      LORazepam (ATIVAN) 0.5 MG tablet Take 0.5 mg by mouth every 6 hours as needed for anxiety        MULTIPLE VITAMIN-FOLIC ACID PO Take 1 tablet by mouth daily      .    ROS:  The 10 point Review of Systems is negative other than noted in the HPI or here.     PHYSICAL EXAMINATION  Vital Signs:  B/P: 114/66,  T: 97.8,  P: 59,  R: 18    Cardio:  RRR  Pulmonary:  no respiratory distress  Abdomen:  soft    Neurologic  Mental Status:  fully alert  Cranial Nerves:  visual fields intact  Motor:  no abnormal movements  Sensory:  intact/symmetric to light touch and pin prick throughout upper and lower extremities  Coordination:  FNF and HS intact without dysmetria    Pre-procedure National Institutes of  Health Stroke Scale:   Not applicable    LABS  (most recent Cr, BUN, GFR, PLT, INR, PTT within the past 7 days):  Recent Labs   Lab 09/13/21  1525   CR 1.01   BUN 10   GFRESTIMATED >90      INR 1.07   PTT 25        Platelet Function P2Y12 (PRU):  Not applicable      ASSESSMENT: Left residual temporal AVM intranidal aneurysm status post Jabier 18 embolization in February 2021    PLAN: Diagnostic cerebral angiogram        PRE-PROCEDURE SEDATION ASSESSMENT     Pre-Procedure Sedation Assessment done at 0730.    Expected Level:  Moderate Sedation    Indication:  Sedation is required to allow for neurointerventional procedure.    Consent obtained from patient after discussing the risks, benefits and alternatives.     PO Intake:  Appropriately NPO for procedure    ASA Class:  Class 1 - HEALTHY PATIENT    Mallampati:  Grade 1:  Soft palate, uvula, tonsillar pillars, and posterior pharyngeal wall visible    History and physical reviewed and no updates needed. I have reviewed the lab findings, diagnostic data, medications, and the plan for sedation. I have determined this patient to be an appropriate candidate for the planned sedation and procedure and have reassessed the patient IMMEDIATELY PRIOR to sedation and procedure.    Patient was discussed with the Attending, Dr. Sousa, who agrees with the plan.    Nestor Zamora MD   Neuro IR Fellow  Pager:[phone]

## 2021-09-17 NOTE — DISCHARGE INSTRUCTIONS
[facility]         Interventional Radiology  Discharge Instructions Post Angiogram (NEURO)    AFTER YOU GO HOME  ? If you were given sedation, for the first 24 hours: we recommend that an adult stay with you, DO NOT drive or operate machinery at home or at work, DO NOT smoke, DO NOT make any important or legal decisions.  ? DO NOT drink alcoholic beverages the day of your procedure  ? DO relax and take it easy for 24 hours  ? DO drink plenty of fluids  ? DO resume your regular diet, unless otherwise instructed by your Primary Physician  ? DO NOT take a shower for at least 12 hours following your procedure. No tub bath, hot tubs, or swimming for 5 days. Do NOT scrub the procedure site vigorously for 5 days.    Care of wrist or arm site  It is normal to have soreness at the puncture site and mild tingling in your hand for up to 3 days.    For 2 days, do not use your hand or arm to support your weight (such as rising from a chair) or bend your wrist (such as lifting a garage door).    For 2 days, do not do any strenuous exercise, do not lift more than 5 pounds or exercise your arm (tennis, golf or bowling).    No lotion or powder to the puncture site for 3 days  If you start bleeding from the site in your arm:    Sit down and press firmly on the site with your fingers for 10 minutes. Call your doctor as soon as you can.    If the bleeding stops, sit still and keep your wrist straight for 2 hours.    Call 911 right away if you have: Heavy bleeding, bleeding that does not stop.        CALL THE PHYSICIAN IF:  ? You start bleeding from the procedure site.  ? You have numbness, coolness or change in color of the arm or leg of the puncture site  ? You experience changes in your vision, hearing, balance, coordination, speech, thinking or memory  ? You experience weakness in one or more extremity  ? You experience pain or redness at the puncture site  ? You develop nausea or vomiting  ? You develop hives  or a rash or unexplained itching  ? You develop a temperature of 101 degrees F or greater      ADDITIONAL INSTRUCTIONS: none    [facility] INTERVENTIONAL RADIOLOGY DEPARTMENT  Procedure Physician:  Dr. Sousa, Dr. Maci Garcia  Date of procedure: September 17, 2021    Telephone Numbers: [phone] Monday-Friday 8:00 am to 4:30 pm  [phone] After 4:30 pm Monday-Friday, Weekends & Holidays.   Ask for the Neuro-Interventional doctor on call.  Someone is on call 24 hrs/day  [facility] toll free number: [phone] Monday-Friday 8:00 am to 4:30 pm  [facility] Emergency Dept: [phone]

## 2021-09-17 NOTE — PROGRESS NOTES
R radial compression device successfully decompressed and removed w/o complications. R radial drsg site C/D/I, no hematoma/bleeding noted. No neuro changes. Pt stable.

## 2021-09-17 NOTE — PROGRESS NOTES
Arrived to Unit 2a for cerebral angiogram procedure in IR. AFVSS. Denies pain. No neuro deficits. Labs resulted 9/13/21. Consent done. Contrast reviewed. Jose molina marked. Natali NICHOLS, at bedside #[phone]. Pre procedural cares complete.

## 2021-09-17 NOTE — PROCEDURES
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 Sousa in 6 months    Primary Surgeon:  Dr. Gregory Sousa  Secondary Surgeon:  Not applicable  Secondary Surgeon Review:  None  Fellow: /Dr Lux  Additional Assistants:     Prior to the start of the procedure and with procedural staff participation, I verbally confirmed: the patient s identity using two indicators, relevant allergies, that the procedure was appropriate and matched the consent or emergent situation, and that the correct equipment/implants were available. Immediately prior to starting the procedure I conducted the Time Out with the procedural staff and re-confirmed the patient s name, procedure, and site/side. (The Joint Commission universal protocol was followed.)  Yes    PRU value: Not applicable    Anesthesia:  Conscious Sedation  Medications:  Fentanyl 75 mcg, 1% plain lidocaine, radial cocktail [3000 units heparin, 200 mcg nitroglycerin, 2.5 mg fentanyl]  Puncture site:  Right Radial Artery    Fluoroscopy time (minutes): 12.4  Radiation dose (mGy):  386.4    Contrast amount (mL):  40     Estimated blood loss (mL): 5    Closure:  Device TR band    Disposition:  Home after recovery.        Sedation Post-Procedure Summary    Sedatives: Midazolam (Versed) 1.5 mg    Vital signs and pulse oximetry were monitored and remained stable throughout the procedure, and sedation was maintained until the procedure was complete.  The patient was monitored by staff until sedation  discharge criteria were met.    Patient tolerance:  Patient tolerated the procedure well with no immediate complications.    Time of sedation in minutes:  30 minutes from beginning to end of physician one to one monitoring.    Nestor Zamora MD  Neuro IR fellow  Pager: [phone]

## 2021-09-17 NOTE — PROGRESS NOTES
Pt arrived on 2a post cerebral anigiogram. VSS RA. Right wrist with TR band in place. Family at bedside. Pt eating and drinking.

## 2021-09-17 NOTE — IR NOTE
Patient Name: Emmanuel Ballard  Medical Record Number: [id number]  Today's Date: 9/17/2021    Procedure: diagnostic cerebral angiogram  Proceduralist: Dr. Zamora, Dr. Lux, Dr. Sousa     Procedure Start: 1000  Procedure end: 10:28   Sedation medications administered: 1.5 mg versed, 75 mcg fentanyl     Report given to:  VAHID Gee  : none    Other Notes: Pt arrived to IR room 3 from . Consent reviewed. Pt denies any questions or concerns regarding procedure. Pt positioned supin and monitored per protocol. Pt tolerated procedure without any noted complications. Right radial access site with teraband applied.   Pt transferred back to .

## 2022-01-04 ENCOUNTER — TELEPHONE (OUTPATIENT)
Dept: NEUROSURGERY | Facility: CLINIC | Age: 32
End: 2022-01-04
Payer: COMMERCIAL

## 2022-03-22 ENCOUNTER — VIRTUAL VISIT (OUTPATIENT)
Dept: NEUROSURGERY | Facility: CLINIC | Age: 32
End: 2022-03-22
Payer: COMMERCIAL

## 2022-03-22 DIAGNOSIS — Q28.2 AVM (ARTERIOVENOUS MALFORMATION) BRAIN: Primary | ICD-10-CM

## 2022-03-22 PROCEDURE — 99207 PR NO SHOW FOR SCHEDULED APPT: CPT | Mod: GT | Performed by: NEUROLOGICAL SURGERY

## 2022-03-22 NOTE — PROGRESS NOTES
Emmanuel is a 31 year old who is being evaluated via a billable video visit.      I have tried calling Feliberto twice and cannot reach him.  His voice message box is full and I cannot leave a voice message.    We will need to reschedule.

## 2022-03-22 NOTE — LETTER
Date:March 22, 2022      Provider requested that no letter be sent. Do not send.       [facility]

## 2022-03-22 NOTE — LETTER
3/22/2022       RE: Emmanuel Ballard  [address]  [place] [address]     Dear Colleague,    Thank you for referring your patient, Emmanuel Ballard, to the [facility]. Please see a copy of my visit note below.    Emmanuel is a 31 year old who is being evaluated via a billable video visit.      I have tried calling Feliberto twice and cannot reach him.  His voice message box is full and I cannot leave a voice message.    We will need to reschedule.      Again, thank you for allowing me to participate in the care of your patient.      Sincerely,    Gregory Sousa MD

## 2022-03-23 ENCOUNTER — TELEPHONE (OUTPATIENT)
Dept: NEUROSURGERY | Facility: CLINIC | Age: 32
End: 2022-03-23
Payer: COMMERCIAL

## 2022-03-24 ENCOUNTER — TELEPHONE (OUTPATIENT)
Dept: NEUROSURGERY | Facility: CLINIC | Age: 32
End: 2022-03-24
Payer: COMMERCIAL

## 2022-03-25 ENCOUNTER — TELEPHONE (OUTPATIENT)
Dept: NEUROSURGERY | Facility: CLINIC | Age: 32
End: 2022-03-25
Payer: COMMERCIAL

## (undated) RX ORDER — FENTANYL CITRATE 50 UG/ML
INJECTION, SOLUTION INTRAMUSCULAR; INTRAVENOUS
Status: DISPENSED
Start: 2021-02-26

## (undated) RX ORDER — HEPARIN SODIUM 1000 [USP'U]/ML
INJECTION, SOLUTION INTRAVENOUS; SUBCUTANEOUS
Status: DISPENSED
Start: 2021-09-17

## (undated) RX ORDER — HYDROMORPHONE HYDROCHLORIDE 1 MG/ML
INJECTION, SOLUTION INTRAMUSCULAR; INTRAVENOUS; SUBCUTANEOUS
Status: DISPENSED
Start: 2021-02-26

## (undated) RX ORDER — ASPIRIN 325 MG
TABLET ORAL
Status: DISPENSED
Start: 2021-02-26

## (undated) RX ORDER — LIDOCAINE HYDROCHLORIDE 10 MG/ML
INJECTION, SOLUTION EPIDURAL; INFILTRATION; INTRACAUDAL; PERINEURAL
Status: DISPENSED
Start: 2021-02-26

## (undated) RX ORDER — PROTAMINE SULFATE 10 MG/ML
INJECTION, SOLUTION INTRAVENOUS
Status: DISPENSED
Start: 2021-02-26

## (undated) RX ORDER — VERAPAMIL HYDROCHLORIDE 2.5 MG/ML
INJECTION, SOLUTION INTRAVENOUS
Status: DISPENSED
Start: 2021-09-17

## (undated) RX ORDER — NITROGLYCERIN 5 MG/ML
VIAL (ML) INTRAVENOUS
Status: DISPENSED
Start: 2021-09-17

## (undated) RX ORDER — FENTANYL CITRATE 50 UG/ML
INJECTION, SOLUTION INTRAMUSCULAR; INTRAVENOUS
Status: DISPENSED
Start: 2021-09-17

## (undated) RX ORDER — HEPARIN SODIUM 200 [USP'U]/100ML
INJECTION, SOLUTION INTRAVENOUS
Status: DISPENSED
Start: 2021-09-17

## (undated) RX ORDER — HEPARIN SODIUM 1000 [USP'U]/ML
INJECTION, SOLUTION INTRAVENOUS; SUBCUTANEOUS
Status: DISPENSED
Start: 2021-02-26

## (undated) RX ORDER — ACETAMINOPHEN 325 MG/1
TABLET ORAL
Status: DISPENSED
Start: 2021-02-26

## (undated) RX ORDER — LIDOCAINE HYDROCHLORIDE 10 MG/ML
INJECTION, SOLUTION EPIDURAL; INFILTRATION; INTRACAUDAL; PERINEURAL
Status: DISPENSED
Start: 2021-02-11

## (undated) RX ORDER — HEPARIN SODIUM 1000 [USP'U]/ML
INJECTION, SOLUTION INTRAVENOUS; SUBCUTANEOUS
Status: DISPENSED
Start: 2021-02-11

## (undated) RX ORDER — HYDROMORPHONE HCL IN WATER/PF 6 MG/30 ML
PATIENT CONTROLLED ANALGESIA SYRINGE INTRAVENOUS
Status: DISPENSED
Start: 2021-02-26

## (undated) RX ORDER — SODIUM CHLORIDE 9 MG/ML
INJECTION, SOLUTION INTRAVENOUS
Status: DISPENSED
Start: 2021-09-17

## (undated) RX ORDER — PROTAMINE SULFATE 10 MG/ML
INJECTION, SOLUTION INTRAVENOUS
Status: DISPENSED
Start: 2021-02-11

## (undated) RX ORDER — VERAPAMIL HYDROCHLORIDE 2.5 MG/ML
INJECTION, SOLUTION INTRAVENOUS
Status: DISPENSED
Start: 2021-02-26

## (undated) RX ORDER — FENTANYL CITRATE 50 UG/ML
INJECTION, SOLUTION INTRAMUSCULAR; INTRAVENOUS
Status: DISPENSED
Start: 2021-02-11